# Patient Record
Sex: FEMALE | Race: OTHER | Employment: FULL TIME | URBAN - METROPOLITAN AREA
[De-identification: names, ages, dates, MRNs, and addresses within clinical notes are randomized per-mention and may not be internally consistent; named-entity substitution may affect disease eponyms.]

---

## 2023-07-04 ENCOUNTER — HOSPITAL ENCOUNTER (EMERGENCY)
Facility: HOSPITAL | Age: 38
Discharge: HOME | End: 2023-07-04
Attending: EMERGENCY MEDICINE
Payer: COMMERCIAL

## 2023-07-04 ENCOUNTER — APPOINTMENT (OUTPATIENT)
Dept: ULTRASOUND IMAGING | Facility: HOSPITAL | Age: 38
End: 2023-07-04
Attending: EMERGENCY MEDICINE
Payer: COMMERCIAL

## 2023-07-04 VITALS
RESPIRATION RATE: 18 BRPM | DIASTOLIC BLOOD PRESSURE: 96 MMHG | BODY MASS INDEX: 34.41 KG/M2 | HEIGHT: 62 IN | WEIGHT: 187 LBS | TEMPERATURE: 97 F | HEART RATE: 95 BPM | OXYGEN SATURATION: 95 % | SYSTOLIC BLOOD PRESSURE: 160 MMHG

## 2023-07-04 DIAGNOSIS — O21.9 NAUSEA AND VOMITING IN PREGNANCY: Primary | ICD-10-CM

## 2023-07-04 LAB
ALBUMIN SERPL-MCNC: 3.9 G/DL (ref 3.4–5)
ALBUMIN/GLOB SERPL: 0.9 {RATIO} (ref 1–2)
ALP LIVER SERPL-CCNC: 71 U/L
ALT SERPL-CCNC: 19 U/L
ANION GAP SERPL CALC-SCNC: 8 MMOL/L (ref 0–18)
AST SERPL-CCNC: 14 U/L (ref 15–37)
B-HCG SERPL-ACNC: ABNORMAL MIU/ML
B-HCG UR QL: POSITIVE
BASOPHILS # BLD AUTO: 0.03 X10(3) UL (ref 0–0.2)
BASOPHILS NFR BLD AUTO: 0.4 %
BILIRUB SERPL-MCNC: 0.2 MG/DL (ref 0.1–2)
BILIRUB UR QL STRIP.AUTO: NEGATIVE
BUN BLD-MCNC: 10 MG/DL (ref 7–18)
CALCIUM BLD-MCNC: 9.4 MG/DL (ref 8.5–10.1)
CHLORIDE SERPL-SCNC: 105 MMOL/L (ref 98–112)
CO2 SERPL-SCNC: 22 MMOL/L (ref 21–32)
COLOR UR AUTO: YELLOW
CREAT BLD-MCNC: 0.61 MG/DL
EOSINOPHIL # BLD AUTO: 0.08 X10(3) UL (ref 0–0.7)
EOSINOPHIL NFR BLD AUTO: 1 %
ERYTHROCYTE [DISTWIDTH] IN BLOOD BY AUTOMATED COUNT: 12.8 %
GFR SERPLBLD BASED ON 1.73 SQ M-ARVRAT: 117 ML/MIN/1.73M2 (ref 60–?)
GLOBULIN PLAS-MCNC: 4.3 G/DL (ref 2.8–4.4)
GLUCOSE BLD-MCNC: 149 MG/DL (ref 70–99)
GLUCOSE UR STRIP.AUTO-MCNC: NEGATIVE MG/DL
HCT VFR BLD AUTO: 38.4 %
HGB BLD-MCNC: 12.6 G/DL
IMM GRANULOCYTES # BLD AUTO: 0.04 X10(3) UL (ref 0–1)
IMM GRANULOCYTES NFR BLD: 0.5 %
KETONES UR STRIP.AUTO-MCNC: NEGATIVE MG/DL
LEUKOCYTE ESTERASE UR QL STRIP.AUTO: NEGATIVE
LIPASE SERPL-CCNC: 29 U/L (ref 13–75)
LYMPHOCYTES # BLD AUTO: 1.64 X10(3) UL (ref 1–4)
LYMPHOCYTES NFR BLD AUTO: 20.6 %
MCH RBC QN AUTO: 28.8 PG (ref 26–34)
MCHC RBC AUTO-ENTMCNC: 32.8 G/DL (ref 31–37)
MCV RBC AUTO: 87.9 FL
MONOCYTES # BLD AUTO: 0.65 X10(3) UL (ref 0.1–1)
MONOCYTES NFR BLD AUTO: 8.1 %
NEUTROPHILS # BLD AUTO: 5.54 X10 (3) UL (ref 1.5–7.7)
NEUTROPHILS # BLD AUTO: 5.54 X10(3) UL (ref 1.5–7.7)
NEUTROPHILS NFR BLD AUTO: 69.4 %
NITRITE UR QL STRIP.AUTO: NEGATIVE
OSMOLALITY SERPL CALC.SUM OF ELEC: 282 MOSM/KG (ref 275–295)
PH UR STRIP.AUTO: 5 [PH] (ref 5–8)
PLATELET # BLD AUTO: 357 10(3)UL (ref 150–450)
POTASSIUM SERPL-SCNC: 3.6 MMOL/L (ref 3.5–5.1)
PROT SERPL-MCNC: 8.2 G/DL (ref 6.4–8.2)
PROT UR STRIP.AUTO-MCNC: NEGATIVE MG/DL
RBC # BLD AUTO: 4.37 X10(6)UL
RBC UR QL AUTO: NEGATIVE
SODIUM SERPL-SCNC: 135 MMOL/L (ref 136–145)
SP GR UR STRIP.AUTO: 1.02 (ref 1–1.03)
UROBILINOGEN UR STRIP.AUTO-MCNC: <2 MG/DL
WBC # BLD AUTO: 8 X10(3) UL (ref 4–11)

## 2023-07-04 PROCEDURE — 83690 ASSAY OF LIPASE: CPT | Performed by: EMERGENCY MEDICINE

## 2023-07-04 PROCEDURE — 76817 TRANSVAGINAL US OBSTETRIC: CPT | Performed by: EMERGENCY MEDICINE

## 2023-07-04 PROCEDURE — 81025 URINE PREGNANCY TEST: CPT

## 2023-07-04 PROCEDURE — 84702 CHORIONIC GONADOTROPIN TEST: CPT | Performed by: EMERGENCY MEDICINE

## 2023-07-04 PROCEDURE — 81001 URINALYSIS AUTO W/SCOPE: CPT | Performed by: EMERGENCY MEDICINE

## 2023-07-04 PROCEDURE — 99284 EMERGENCY DEPT VISIT MOD MDM: CPT

## 2023-07-04 PROCEDURE — 80053 COMPREHEN METABOLIC PANEL: CPT

## 2023-07-04 PROCEDURE — 76801 OB US < 14 WKS SINGLE FETUS: CPT | Performed by: EMERGENCY MEDICINE

## 2023-07-04 PROCEDURE — 80053 COMPREHEN METABOLIC PANEL: CPT | Performed by: EMERGENCY MEDICINE

## 2023-07-04 PROCEDURE — 36415 COLL VENOUS BLD VENIPUNCTURE: CPT

## 2023-07-04 PROCEDURE — 85025 COMPLETE CBC W/AUTO DIFF WBC: CPT

## 2023-07-04 PROCEDURE — 85025 COMPLETE CBC W/AUTO DIFF WBC: CPT | Performed by: EMERGENCY MEDICINE

## 2023-07-04 PROCEDURE — 83690 ASSAY OF LIPASE: CPT

## 2023-07-04 PROCEDURE — 99285 EMERGENCY DEPT VISIT HI MDM: CPT

## 2023-07-04 RX ORDER — HYDRALAZINE HYDROCHLORIDE 50 MG/1
50 TABLET, FILM COATED ORAL 3 TIMES DAILY
Qty: 270 TABLET | Refills: 0 | Status: SHIPPED | OUTPATIENT
Start: 2023-07-04 | End: 2023-07-21

## 2023-07-04 RX ORDER — THYROID 60 MG/1
60 TABLET ORAL DAILY
COMMUNITY
End: 2023-07-21

## 2023-07-04 RX ORDER — LOSARTAN POTASSIUM 100 MG/1
100 TABLET ORAL DAILY
COMMUNITY
End: 2023-07-21

## 2023-07-04 RX ORDER — LEFLUNOMIDE 20 MG/1
20 TABLET ORAL DAILY
COMMUNITY
End: 2023-07-21

## 2023-07-04 RX ORDER — MELOXICAM 15 MG/1
15 TABLET ORAL DAILY PRN
COMMUNITY
End: 2023-07-21

## 2023-07-04 NOTE — ED INITIAL ASSESSMENT (HPI)
Patient to ED c/o headache and vomiting for three days. No fevers, endorses urinary frequency. Headache is frontal and comes and goes.

## 2023-07-20 PROBLEM — R76.8 RHEUMATOID FACTOR POSITIVE: Status: ACTIVE | Noted: 2023-07-20

## 2023-07-20 PROBLEM — M06.4 INFLAMMATORY POLYARTHROPATHY (HCC): Status: ACTIVE | Noted: 2022-09-14

## 2023-07-20 PROBLEM — D50.9 IRON DEFICIENCY ANEMIA: Status: ACTIVE | Noted: 2022-08-08

## 2023-07-20 PROBLEM — I10 ESSENTIAL HYPERTENSION: Status: ACTIVE | Noted: 2022-05-02

## 2023-07-20 PROBLEM — R73.03 PREDIABETES: Status: ACTIVE | Noted: 2023-04-25

## 2023-07-20 PROBLEM — R76.0 ABNORMAL ANTIBODY TITER: Status: ACTIVE | Noted: 2018-05-02

## 2023-07-20 PROBLEM — N92.0 MENORRHAGIA: Status: ACTIVE | Noted: 2022-05-02

## 2023-07-20 PROBLEM — O21.0 MORNING SICKNESS: Status: ACTIVE | Noted: 2023-07-05

## 2023-07-20 PROBLEM — E11.9 TYPE II DIABETES MELLITUS, WELL CONTROLLED (HCC): Status: ACTIVE | Noted: 2022-05-02

## 2023-07-20 PROBLEM — E11.9 DM (DIABETES MELLITUS) (HCC): Status: ACTIVE | Noted: 2022-05-02

## 2023-07-20 PROBLEM — M19.90 OSTEOARTHROSIS: Status: ACTIVE | Noted: 2018-05-02

## 2023-07-20 PROBLEM — O21.0: Status: ACTIVE | Noted: 2023-07-05

## 2023-07-21 ENCOUNTER — ULTRASOUND ENCOUNTER (OUTPATIENT)
Dept: OBGYN CLINIC | Facility: CLINIC | Age: 38
End: 2023-07-21
Payer: COMMERCIAL

## 2023-07-21 ENCOUNTER — OFFICE VISIT (OUTPATIENT)
Dept: OBGYN CLINIC | Facility: CLINIC | Age: 38
End: 2023-07-21
Payer: COMMERCIAL

## 2023-07-21 VITALS
SYSTOLIC BLOOD PRESSURE: 112 MMHG | WEIGHT: 187.5 LBS | HEART RATE: 86 BPM | DIASTOLIC BLOOD PRESSURE: 70 MMHG | BODY MASS INDEX: 34 KG/M2

## 2023-07-21 DIAGNOSIS — O09.521 MULTIGRAVIDA OF ADVANCED MATERNAL AGE IN FIRST TRIMESTER: ICD-10-CM

## 2023-07-21 DIAGNOSIS — O09.891 MEDICATION EXPOSURE DURING FIRST TRIMESTER OF PREGNANCY: ICD-10-CM

## 2023-07-21 DIAGNOSIS — M06.4 INFLAMMATORY POLYARTHROPATHY (HCC): ICD-10-CM

## 2023-07-21 DIAGNOSIS — R73.03 PREDIABETES: ICD-10-CM

## 2023-07-21 DIAGNOSIS — I10 ESSENTIAL HYPERTENSION: ICD-10-CM

## 2023-07-21 DIAGNOSIS — N91.2 AMENORRHEA: Primary | ICD-10-CM

## 2023-07-21 PROBLEM — O10.019: Status: ACTIVE | Noted: 2023-07-21

## 2023-07-21 PROBLEM — M47.819 SPONDYLARTHRITIS: Status: ACTIVE | Noted: 2023-07-21

## 2023-07-21 PROBLEM — O16.9 HYPERTENSION IN PREGNANCY: Status: ACTIVE | Noted: 2023-07-21

## 2023-07-21 PROBLEM — E11.9 TYPE II DIABETES MELLITUS, WELL CONTROLLED (HCC): Status: RESOLVED | Noted: 2022-05-02 | Resolved: 2023-07-21

## 2023-07-21 PROBLEM — O16.9 HYPERTENSION IN PREGNANCY (HCC): Status: ACTIVE | Noted: 2023-07-21

## 2023-07-21 PROCEDURE — 99204 OFFICE O/P NEW MOD 45 MIN: CPT | Performed by: OBSTETRICS & GYNECOLOGY

## 2023-07-21 PROCEDURE — 3078F DIAST BP <80 MM HG: CPT | Performed by: OBSTETRICS & GYNECOLOGY

## 2023-07-21 PROCEDURE — 76856 US EXAM PELVIC COMPLETE: CPT | Performed by: OBSTETRICS & GYNECOLOGY

## 2023-07-21 PROCEDURE — 3074F SYST BP LT 130 MM HG: CPT | Performed by: OBSTETRICS & GYNECOLOGY

## 2023-07-21 RX ORDER — ONDANSETRON 4 MG/1
TABLET, ORALLY DISINTEGRATING ORAL
COMMUNITY
Start: 2023-07-05

## 2023-07-21 RX ORDER — CHOLESTYRAMINE 4 G/9G
POWDER, FOR SUSPENSION ORAL
COMMUNITY
Start: 2023-07-06

## 2023-07-21 RX ORDER — AMOXICILLIN 400 MG/5ML
POWDER, FOR SUSPENSION ORAL
COMMUNITY
Start: 2023-07-19

## 2023-07-21 RX ORDER — LEVOTHYROXINE SODIUM 0.1 MG/1
TABLET ORAL
COMMUNITY
Start: 2023-07-20

## 2023-07-21 RX ORDER — LABETALOL 100 MG/1
TABLET, FILM COATED ORAL
COMMUNITY
Start: 2023-07-19

## 2023-07-21 RX ORDER — LEFLUNOMIDE 10 MG/1
TABLET ORAL
COMMUNITY
Start: 2023-03-18 | End: 2023-07-21

## 2023-07-21 RX ORDER — AMLODIPINE BESYLATE 2.5 MG/1
1 TABLET ORAL DAILY
COMMUNITY
End: 2023-07-21

## 2023-07-21 RX ORDER — DOXYLAMINE SUCCINATE AND PYRIDOXINE HYDROCHLORIDE, DELAYED RELEASE TABLETS 10 MG/10 MG 10; 10 MG/1; MG/1
2 TABLET, DELAYED RELEASE ORAL NIGHTLY
COMMUNITY

## 2023-07-21 NOTE — PROGRESS NOTES
Subjective:  Patient presents complaining of no menses. Positive home pregnancy test.  LMP 23. ED visit 23 with ultrasound showing 6w1d gestational sac. Previous pregnancy uncomplicated  section x 2. Patient was taking leflunomide Class X until 7/3/23. Patient was given cholestyramine by rheumatologist (to clear the medication from her system?). Patient was aware that she needed reliable contraception but had Paragard IUD pulled several years ago and has been having unprotected sex since then. Hx of hypothyroidism. Recent TSH 7.73 and synthroid dosage increased. Takes labetalol for hypertension. Was told she had pre diabetes but has not been treated, managed with diet. Patient RF positive, diagnosed with spondyarthritis. Chart lists abnormal antibody titre, but patient now aware of any antibody problems. Objective:  /70   Pulse 86   Wt 187 lb 8 oz (85 kg)   LMP 2023 (Approximate)     Physical Examination:  General appearance: Well dressed, well nourished in no apparent distress  Neurologic/Psychiatric: Alert and oriented to person, place and time, mood normal, affect appropriate    Summary of Ultrasound Findings:  LMP 23  8w4day by LMP;  8w1day by ultrasound  Viable intrauterine pregnancy  Dates consistent with ultrasound. Final EDC:  24 by LMP consistent with ultrasound on 2023  Normal ovaries bilaterally     Assessment/Plan:  Amenorrhea- Normal dating ultrasound  Class X medication exposure- counseled regarding high risk for birth defects. Recommend early MFM consult, FTS and Level 2 ultrasound. AMA- counseled. Likely will desire Level 2 and cfDNA  CHtn- CMP ordered today. Will need baseline urine protein  Hypothyroid- recheck TSH one month  Prediabetes- HgA1c ordered. Check early glucola  Obesity- Level 2 ultrasound  HEG- recommend discontinue Zofran. OK to continue Declegis.   Call if not tolerating po  RF Positive  Abnormal antibody titre- check Ab screen with initial OB labs  Previous CS x 2- Likely will desires BS with RCS 39 weeks  Follow up 3-4 weeks for new OB visit. Prenatal vitamin with 0.4 mg folate, DHA. Optional prenatal screening tests reviewed including cfdna, carrier screen/CF, NT/first trimester screen, Quad/AFP, Level 2 ultrasound, amniocentesis/CVS.  Bleeding precautions provided. Diagnoses and all orders for this visit:    Amenorrhea  -     US PELVIS, ABDOMINAL GYNE EMG ONLY; Future    Multigravida of advanced maternal age in first trimester  -     OP REFERRAL TO  CONSULT    Medication exposure during first trimester of pregnancy  -     OP REFERRAL TO  CONSULT    Essential hypertension  -     COMP METABOLIC PANEL (14); Future  -     OP REFERRAL TO  CONSULT    Prediabetes  -     HEMOGLOBIN A1C; Future  -     GLUCOSE 1HR OB; Future    Inflammatory polyarthropathy ([address]       Return in about 4 weeks (around 2023) for New OB Visit.

## 2023-07-26 ENCOUNTER — PATIENT MESSAGE (OUTPATIENT)
Dept: OBGYN CLINIC | Facility: CLINIC | Age: 38
End: 2023-07-26

## 2023-07-26 DIAGNOSIS — O21.9 NAUSEA/VOMITING IN PREGNANCY: Primary | ICD-10-CM

## 2023-07-27 RX ORDER — METOCLOPRAMIDE 10 MG/1
10 TABLET ORAL EVERY 6 HOURS PRN
Qty: 30 TABLET | Refills: 0 | Status: SHIPPED | OUTPATIENT
Start: 2023-07-27

## 2023-07-27 RX ORDER — ONDANSETRON 4 MG/1
TABLET, FILM COATED ORAL EVERY 8 HOURS PRN
Qty: 30 TABLET | Refills: 0 | Status: SHIPPED | OUTPATIENT
Start: 2023-07-27

## 2023-07-27 NOTE — TELEPHONE ENCOUNTER
Prescriptions for reglan and zofran sent. Would recommend trying reglan first, then adding in zofran if needed. If she is unable to keep down fluids then she needs to be seen in urgent care or emergency room for IV fluids and possible hospital admission.

## 2023-08-07 ENCOUNTER — LAB ENCOUNTER (OUTPATIENT)
Dept: LAB | Age: 38
End: 2023-08-07
Attending: OBSTETRICS & GYNECOLOGY
Payer: COMMERCIAL

## 2023-08-07 DIAGNOSIS — R73.03 PREDIABETES: ICD-10-CM

## 2023-08-07 DIAGNOSIS — I10 ESSENTIAL HYPERTENSION: ICD-10-CM

## 2023-08-07 LAB
ALBUMIN SERPL-MCNC: 3.6 G/DL (ref 3.4–5)
ALBUMIN/GLOB SERPL: 0.9 {RATIO} (ref 1–2)
ALP LIVER SERPL-CCNC: 55 U/L
ALT SERPL-CCNC: 24 U/L
ANION GAP SERPL CALC-SCNC: 9 MMOL/L (ref 0–18)
AST SERPL-CCNC: 17 U/L (ref 15–37)
BILIRUB SERPL-MCNC: 0.4 MG/DL (ref 0.1–2)
BUN BLD-MCNC: 7 MG/DL (ref 7–18)
CALCIUM BLD-MCNC: 9.6 MG/DL (ref 8.5–10.1)
CHLORIDE SERPL-SCNC: 102 MMOL/L (ref 98–112)
CO2 SERPL-SCNC: 23 MMOL/L (ref 21–32)
CREAT BLD-MCNC: 0.69 MG/DL
EGFRCR SERPLBLD CKD-EPI 2021: 112 ML/MIN/1.73M2 (ref 60–?)
EST. AVERAGE GLUCOSE BLD GHB EST-MCNC: 148 MG/DL (ref 68–126)
FASTING STATUS PATIENT QL REPORTED: YES
GLOBULIN PLAS-MCNC: 4.2 G/DL (ref 2.8–4.4)
GLUCOSE 1H P GLC SERPL-MCNC: 211 MG/DL
GLUCOSE BLD-MCNC: 220 MG/DL (ref 70–99)
HBA1C MFR BLD: 6.8 % (ref ?–5.7)
OSMOLALITY SERPL CALC.SUM OF ELEC: 283 MOSM/KG (ref 275–295)
POTASSIUM SERPL-SCNC: 3.5 MMOL/L (ref 3.5–5.1)
PROT SERPL-MCNC: 7.8 G/DL (ref 6.4–8.2)
SODIUM SERPL-SCNC: 134 MMOL/L (ref 136–145)

## 2023-08-07 PROCEDURE — 80053 COMPREHEN METABOLIC PANEL: CPT | Performed by: OBSTETRICS & GYNECOLOGY

## 2023-08-07 PROCEDURE — 83036 HEMOGLOBIN GLYCOSYLATED A1C: CPT | Performed by: OBSTETRICS & GYNECOLOGY

## 2023-08-07 PROCEDURE — 82950 GLUCOSE TEST: CPT | Performed by: OBSTETRICS & GYNECOLOGY

## 2023-08-08 DIAGNOSIS — O24.419 GESTATIONAL DIABETES MELLITUS (GDM) IN FIRST TRIMESTER, GESTATIONAL DIABETES METHOD OF CONTROL UNSPECIFIED: ICD-10-CM

## 2023-08-08 PROBLEM — O09.299 PREVIOUS GESTATIONAL DIABETES MELLITUS, ANTEPARTUM: Status: ACTIVE | Noted: 2023-08-08

## 2023-08-08 PROBLEM — Z86.32 PREVIOUS GESTATIONAL DIABETES MELLITUS, ANTEPARTUM (HCC): Status: ACTIVE | Noted: 2023-08-08

## 2023-08-08 PROBLEM — O09.299 PREVIOUS GESTATIONAL DIABETES MELLITUS, ANTEPARTUM (HCC): Status: ACTIVE | Noted: 2023-08-08

## 2023-08-08 PROBLEM — Z86.32 PREVIOUS GESTATIONAL DIABETES MELLITUS, ANTEPARTUM: Status: ACTIVE | Noted: 2023-08-08

## 2023-08-09 ENCOUNTER — ULTRASOUND ENCOUNTER (OUTPATIENT)
Dept: PERINATAL CARE | Facility: HOSPITAL | Age: 38
End: 2023-08-09
Attending: OBSTETRICS & GYNECOLOGY
Payer: COMMERCIAL

## 2023-08-09 VITALS
SYSTOLIC BLOOD PRESSURE: 130 MMHG | DIASTOLIC BLOOD PRESSURE: 83 MMHG | HEIGHT: 62 IN | HEART RATE: 90 BPM | WEIGHT: 187 LBS | BODY MASS INDEX: 34.41 KG/M2

## 2023-08-09 DIAGNOSIS — O09.521 MULTIGRAVIDA OF ADVANCED MATERNAL AGE IN FIRST TRIMESTER: ICD-10-CM

## 2023-08-09 DIAGNOSIS — O09.521 MULTIGRAVIDA OF ADVANCED MATERNAL AGE IN FIRST TRIMESTER: Primary | ICD-10-CM

## 2023-08-09 DIAGNOSIS — O99.281 HYPOTHYROIDISM AFFECTING PREGNANCY IN FIRST TRIMESTER: ICD-10-CM

## 2023-08-09 DIAGNOSIS — O09.891 MEDICATION EXPOSURE DURING FIRST TRIMESTER OF PREGNANCY: ICD-10-CM

## 2023-08-09 DIAGNOSIS — O24.111 PRE-EXISTING TYPE 2 DIABETES MELLITUS DURING PREGNANCY IN FIRST TRIMESTER: ICD-10-CM

## 2023-08-09 DIAGNOSIS — O16.1 HYPERTENSION AFFECTING PREGNANCY IN FIRST TRIMESTER: ICD-10-CM

## 2023-08-09 DIAGNOSIS — E03.9 HYPOTHYROIDISM AFFECTING PREGNANCY IN FIRST TRIMESTER: ICD-10-CM

## 2023-08-09 PROCEDURE — 76813 OB US NUCHAL MEAS 1 GEST: CPT | Performed by: OBSTETRICS & GYNECOLOGY

## 2023-08-09 PROCEDURE — 76801 OB US < 14 WKS SINGLE FETUS: CPT | Performed by: OBSTETRICS & GYNECOLOGY

## 2023-08-09 NOTE — PROGRESS NOTES
Patient notified of results and need for appointment with diabetic educator. Patient verbalized understanding. Phone number provided for scheduling.

## 2023-08-10 PROBLEM — O16.1 HYPERTENSION AFFECTING PREGNANCY IN FIRST TRIMESTER: Status: RESOLVED | Noted: 2023-08-09 | Resolved: 2023-08-10

## 2023-08-10 PROBLEM — O16.1 HYPERTENSION AFFECTING PREGNANCY IN FIRST TRIMESTER (HCC): Status: RESOLVED | Noted: 2023-08-09 | Resolved: 2023-08-10

## 2023-08-16 ENCOUNTER — NURSE ONLY (OUTPATIENT)
Dept: ENDOCRINOLOGY CLINIC | Facility: CLINIC | Age: 38
End: 2023-08-16
Payer: COMMERCIAL

## 2023-08-16 VITALS — BODY MASS INDEX: 35 KG/M2 | WEIGHT: 188.81 LBS

## 2023-08-16 DIAGNOSIS — O24.111 PRE-EXISTING TYPE 2 DIABETES MELLITUS DURING PREGNANCY IN FIRST TRIMESTER: Primary | ICD-10-CM

## 2023-08-16 PROCEDURE — G0108 DIAB MANAGE TRN  PER INDIV: HCPCS | Performed by: DIETITIAN, REGISTERED

## 2023-08-16 RX ORDER — LANCETS
EACH MISCELLANEOUS
Qty: 400 EACH | Refills: 3 | Status: SHIPPED | OUTPATIENT
Start: 2023-08-16

## 2023-08-16 RX ORDER — PERPHENAZINE 16 MG/1
1 TABLET, FILM COATED ORAL 4 TIMES DAILY
Qty: 400 EACH | Refills: 3 | Status: SHIPPED | OUTPATIENT
Start: 2023-08-16

## 2023-08-24 ENCOUNTER — INITIAL PRENATAL (OUTPATIENT)
Dept: OBGYN CLINIC | Facility: CLINIC | Age: 38
End: 2023-08-24
Payer: COMMERCIAL

## 2023-08-24 ENCOUNTER — LAB ENCOUNTER (OUTPATIENT)
Dept: LAB | Age: 38
End: 2023-08-24
Attending: STUDENT IN AN ORGANIZED HEALTH CARE EDUCATION/TRAINING PROGRAM
Payer: COMMERCIAL

## 2023-08-24 VITALS
DIASTOLIC BLOOD PRESSURE: 80 MMHG | SYSTOLIC BLOOD PRESSURE: 120 MMHG | HEART RATE: 101 BPM | WEIGHT: 187.5 LBS | BODY MASS INDEX: 34 KG/M2

## 2023-08-24 DIAGNOSIS — O21.9 NAUSEA/VOMITING IN PREGNANCY: ICD-10-CM

## 2023-08-24 DIAGNOSIS — Z3A.13 13 WEEKS GESTATION OF PREGNANCY: Primary | ICD-10-CM

## 2023-08-24 PROBLEM — Z86.32 PREVIOUS GESTATIONAL DIABETES MELLITUS, ANTEPARTUM: Status: RESOLVED | Noted: 2023-08-08 | Resolved: 2023-08-24

## 2023-08-24 PROBLEM — O24.111 PRE-EXISTING TYPE 2 DIABETES MELLITUS DURING PREGNANCY IN FIRST TRIMESTER: Status: RESOLVED | Noted: 2023-08-09 | Resolved: 2023-08-24

## 2023-08-24 PROBLEM — Z98.891 HISTORY OF CESAREAN SECTION: Status: ACTIVE | Noted: 2023-08-24

## 2023-08-24 PROBLEM — O09.299 PREVIOUS GESTATIONAL DIABETES MELLITUS, ANTEPARTUM (HCC): Status: RESOLVED | Noted: 2023-08-08 | Resolved: 2023-08-24

## 2023-08-24 PROBLEM — N92.0 MENORRHAGIA: Status: RESOLVED | Noted: 2022-05-02 | Resolved: 2023-08-24

## 2023-08-24 PROBLEM — O24.111 PRE-EXISTING TYPE 2 DIABETES MELLITUS DURING PREGNANCY IN FIRST TRIMESTER (HCC): Status: RESOLVED | Noted: 2023-08-09 | Resolved: 2023-08-24

## 2023-08-24 PROBLEM — Z86.32 PREVIOUS GESTATIONAL DIABETES MELLITUS, ANTEPARTUM (HCC): Status: RESOLVED | Noted: 2023-08-08 | Resolved: 2023-08-24

## 2023-08-24 PROBLEM — O09.521: Status: ACTIVE | Noted: 2023-07-21

## 2023-08-24 PROBLEM — O09.521 SUPERVISION OF ELDERLY MULTIGRAVIDA, FIRST TRIMESTER: Status: ACTIVE | Noted: 2023-07-21

## 2023-08-24 PROBLEM — R73.03 PREDIABETES: Status: RESOLVED | Noted: 2023-04-25 | Resolved: 2023-08-24

## 2023-08-24 PROBLEM — O09.299 PREVIOUS GESTATIONAL DIABETES MELLITUS, ANTEPARTUM: Status: RESOLVED | Noted: 2023-08-08 | Resolved: 2023-08-24

## 2023-08-24 PROBLEM — I10 ESSENTIAL HYPERTENSION: Status: RESOLVED | Noted: 2022-05-02 | Resolved: 2023-08-24

## 2023-08-24 LAB
ANTIBODY SCREEN: NEGATIVE
BASOPHILS # BLD AUTO: 0.01 X10(3) UL (ref 0–0.2)
BASOPHILS NFR BLD AUTO: 0.1 %
CREAT UR-SCNC: 135 MG/DL
EOSINOPHIL # BLD AUTO: 0.06 X10(3) UL (ref 0–0.7)
EOSINOPHIL NFR BLD AUTO: 0.6 %
ERYTHROCYTE [DISTWIDTH] IN BLOOD BY AUTOMATED COUNT: 13 %
HBV SURFACE AG SER-ACNC: <0.1 [IU]/L
HBV SURFACE AG SERPL QL IA: NONREACTIVE
HCT VFR BLD AUTO: 34.2 %
HCV AB SERPL QL IA: NONREACTIVE
HGB BLD-MCNC: 11.7 G/DL
IMM GRANULOCYTES # BLD AUTO: 0.04 X10(3) UL (ref 0–1)
IMM GRANULOCYTES NFR BLD: 0.4 %
LYMPHOCYTES # BLD AUTO: 1.42 X10(3) UL (ref 1–4)
LYMPHOCYTES NFR BLD AUTO: 14.1 %
MCH RBC QN AUTO: 29.8 PG (ref 26–34)
MCHC RBC AUTO-ENTMCNC: 34.2 G/DL (ref 31–37)
MCV RBC AUTO: 87.2 FL
MONOCYTES # BLD AUTO: 0.49 X10(3) UL (ref 0.1–1)
MONOCYTES NFR BLD AUTO: 4.9 %
NEUTROPHILS # BLD AUTO: 8.08 X10 (3) UL (ref 1.5–7.7)
NEUTROPHILS # BLD AUTO: 8.08 X10(3) UL (ref 1.5–7.7)
NEUTROPHILS NFR BLD AUTO: 79.9 %
PLATELET # BLD AUTO: 356 10(3)UL (ref 150–450)
PROT UR-MCNC: 18.6 MG/DL
PROT/CREAT UR-RTO: 0.14
RBC # BLD AUTO: 3.92 X10(6)UL
RH BLOOD TYPE: POSITIVE
T PALLIDUM AB SER QL IA: NONREACTIVE
TSI SER-ACNC: 0.49 MIU/ML (ref 0.36–3.74)
WBC # BLD AUTO: 10.1 X10(3) UL (ref 4–11)

## 2023-08-24 PROCEDURE — 3079F DIAST BP 80-89 MM HG: CPT | Performed by: STUDENT IN AN ORGANIZED HEALTH CARE EDUCATION/TRAINING PROGRAM

## 2023-08-24 PROCEDURE — 36415 COLL VENOUS BLD VENIPUNCTURE: CPT

## 2023-08-24 PROCEDURE — 87389 HIV-1 AG W/HIV-1&-2 AB AG IA: CPT

## 2023-08-24 PROCEDURE — 86803 HEPATITIS C AB TEST: CPT

## 2023-08-24 PROCEDURE — 87491 CHLMYD TRACH DNA AMP PROBE: CPT | Performed by: STUDENT IN AN ORGANIZED HEALTH CARE EDUCATION/TRAINING PROGRAM

## 2023-08-24 PROCEDURE — 85025 COMPLETE CBC W/AUTO DIFF WBC: CPT

## 2023-08-24 PROCEDURE — 87340 HEPATITIS B SURFACE AG IA: CPT

## 2023-08-24 PROCEDURE — 86900 BLOOD TYPING SEROLOGIC ABO: CPT

## 2023-08-24 PROCEDURE — 87591 N.GONORRHOEAE DNA AMP PROB: CPT | Performed by: STUDENT IN AN ORGANIZED HEALTH CARE EDUCATION/TRAINING PROGRAM

## 2023-08-24 PROCEDURE — 82570 ASSAY OF URINE CREATININE: CPT | Performed by: STUDENT IN AN ORGANIZED HEALTH CARE EDUCATION/TRAINING PROGRAM

## 2023-08-24 PROCEDURE — 87086 URINE CULTURE/COLONY COUNT: CPT | Performed by: STUDENT IN AN ORGANIZED HEALTH CARE EDUCATION/TRAINING PROGRAM

## 2023-08-24 PROCEDURE — 86901 BLOOD TYPING SEROLOGIC RH(D): CPT

## 2023-08-24 PROCEDURE — 3074F SYST BP LT 130 MM HG: CPT | Performed by: STUDENT IN AN ORGANIZED HEALTH CARE EDUCATION/TRAINING PROGRAM

## 2023-08-24 PROCEDURE — 86850 RBC ANTIBODY SCREEN: CPT

## 2023-08-24 PROCEDURE — 88175 CYTOPATH C/V AUTO FLUID REDO: CPT | Performed by: STUDENT IN AN ORGANIZED HEALTH CARE EDUCATION/TRAINING PROGRAM

## 2023-08-24 PROCEDURE — 86780 TREPONEMA PALLIDUM: CPT

## 2023-08-24 PROCEDURE — 84443 ASSAY THYROID STIM HORMONE: CPT

## 2023-08-24 PROCEDURE — 84156 ASSAY OF PROTEIN URINE: CPT | Performed by: STUDENT IN AN ORGANIZED HEALTH CARE EDUCATION/TRAINING PROGRAM

## 2023-08-24 RX ORDER — ONDANSETRON 4 MG/1
4 TABLET, FILM COATED ORAL EVERY 8 HOURS PRN
Qty: 30 TABLET | Refills: 3 | Status: SHIPPED | OUTPATIENT
Start: 2023-08-24

## 2023-08-24 RX ORDER — ASPIRIN 81 MG/1
81 TABLET ORAL DAILY
Qty: 90 TABLET | Refills: 3 | Status: SHIPPED | OUTPATIENT
Start: 2023-08-24

## 2023-08-24 RX ORDER — LABETALOL 200 MG/1
TABLET, FILM COATED ORAL
COMMUNITY
Start: 2023-08-21

## 2023-08-24 RX ORDER — DOXYLAMINE SUCCINATE AND PYRIDOXINE HYDROCHLORIDE, DELAYED RELEASE TABLETS 10 MG/10 MG 10; 10 MG/1; MG/1
2 TABLET, DELAYED RELEASE ORAL NIGHTLY
Qty: 240 TABLET | Refills: 0 | Status: SHIPPED | OUTPATIENT
Start: 2023-08-24

## 2023-08-24 RX ORDER — AMLODIPINE BESYLATE 2.5 MG/1
2.5 TABLET ORAL DAILY
COMMUNITY
Start: 2023-04-25 | End: 2023-08-24

## 2023-08-24 RX ORDER — SCOLOPAMINE TRANSDERMAL SYSTEM 1 MG/1
1 PATCH, EXTENDED RELEASE TRANSDERMAL
Qty: 10 PATCH | Refills: 3 | Status: SHIPPED | OUTPATIENT
Start: 2023-08-24

## 2023-08-25 LAB
C TRACH DNA SPEC QL NAA+PROBE: NEGATIVE
N GONORRHOEA DNA SPEC QL NAA+PROBE: NEGATIVE

## 2023-08-29 ENCOUNTER — TELEPHONE (OUTPATIENT)
Dept: OBGYN CLINIC | Facility: CLINIC | Age: 38
End: 2023-08-29

## 2023-08-29 ENCOUNTER — PATIENT MESSAGE (OUTPATIENT)
Dept: OBGYN CLINIC | Facility: CLINIC | Age: 38
End: 2023-08-29

## 2023-08-29 DIAGNOSIS — O24.112 PRE-EXISTING TYPE 2 DIABETES MELLITUS DURING PREGNANCY IN SECOND TRIMESTER: Primary | ICD-10-CM

## 2023-08-29 DIAGNOSIS — O09.521 MULTIGRAVIDA OF ADVANCED MATERNAL AGE IN FIRST TRIMESTER: ICD-10-CM

## 2023-08-29 DIAGNOSIS — O09.521 MULTIGRAVIDA OF ADVANCED MATERNAL AGE IN FIRST TRIMESTER: Primary | ICD-10-CM

## 2023-08-29 DIAGNOSIS — I10 ESSENTIAL HYPERTENSION: ICD-10-CM

## 2023-08-29 DIAGNOSIS — O24.410 DIET CONTROLLED GESTATIONAL DIABETES MELLITUS (GDM) IN FIRST TRIMESTER: Primary | ICD-10-CM

## 2023-08-29 DIAGNOSIS — O09.891 MEDICATION EXPOSURE DURING FIRST TRIMESTER OF PREGNANCY: ICD-10-CM

## 2023-08-29 DIAGNOSIS — O24.112 PRE-EXISTING TYPE 2 DIABETES MELLITUS DURING PREGNANCY IN SECOND TRIMESTER: ICD-10-CM

## 2023-08-31 ENCOUNTER — DIABETIC EDUCATION (OUTPATIENT)
Dept: ENDOCRINOLOGY CLINIC | Facility: CLINIC | Age: 38
End: 2023-08-31
Payer: COMMERCIAL

## 2023-08-31 DIAGNOSIS — O24.113 PRE-EXISTING TYPE 2 DIABETES MELLITUS DURING PREGNANCY IN THIRD TRIMESTER: Primary | ICD-10-CM

## 2023-09-07 ENCOUNTER — TELEPHONE (OUTPATIENT)
Dept: OBGYN CLINIC | Facility: CLINIC | Age: 38
End: 2023-09-07

## 2023-09-07 ENCOUNTER — TELEPHONE (OUTPATIENT)
Dept: PERINATAL CARE | Facility: HOSPITAL | Age: 38
End: 2023-09-07

## 2023-09-07 DIAGNOSIS — O24.111 PRE-EXISTING TYPE 2 DIABETES MELLITUS DURING PREGNANCY IN FIRST TRIMESTER: Primary | ICD-10-CM

## 2023-09-07 RX ORDER — INSULIN ASPART 100 [IU]/ML
INJECTION, SOLUTION INTRAVENOUS; SUBCUTANEOUS
Qty: 5 EACH | Refills: 11 | Status: SHIPPED | OUTPATIENT
Start: 2023-09-07 | End: 2023-09-11 | Stop reason: CLARIF

## 2023-09-07 RX ORDER — INSULIN DETEMIR 100 [IU]/ML
10 INJECTION, SOLUTION SUBCUTANEOUS NIGHTLY
Qty: 5 EACH | Refills: 11 | Status: SHIPPED | OUTPATIENT
Start: 2023-09-07

## 2023-09-07 NOTE — TELEPHONE ENCOUNTER
15w3d  Anai from [facility] called to notify our office that this patient is starting insulin. Per Dr. Weinstein April:  Levemir: 10 u subcutaneous at bedtime  Aspart  Breakfast: 6 units  Lunch: 10 units  Dinner: 10 units    Next appointment is scheduled with JANAE Madrid- routed as FYI only.

## 2023-09-07 NOTE — TELEPHONE ENCOUNTER
15w3d  Received BS log for date range 9/4-9/6     Low  High Out of Range   Fasting Blood Sugar 101 126 3/3   Post Breakfast 127 166 3/3   Post Lunch 145 208 3/3   Post Dinner 160 179 2/2     Patient is currently diet controlled. Pt has been following with OB and dietician. Pt was referred for MFM to follow sugars on 8/29. Pt started logging mychart on the 4th. To Dr. Veronica Pearl for review.

## 2023-09-07 NOTE — TELEPHONE ENCOUNTER
New regimen:  Please call patient and inform her that I am advising that she initiate insulin    Initiate insulin:  Levemir: 10 u subcutaneous at bedtime  Aspart  Breakfast: 6 units  Lunch: 10 units  Dinner: 10 units    Jovita Pineda. Damian Gonzalez M.D.   Maternal-Fetal Medicine

## 2023-09-11 ENCOUNTER — TELEPHONE (OUTPATIENT)
Dept: OBGYN CLINIC | Facility: CLINIC | Age: 38
End: 2023-09-11

## 2023-09-11 ENCOUNTER — HOSPITAL ENCOUNTER (EMERGENCY)
Facility: HOSPITAL | Age: 38
Discharge: HOME | End: 2023-09-11
Attending: EMERGENCY MEDICINE
Payer: COMMERCIAL

## 2023-09-11 VITALS
HEIGHT: 62 IN | HEART RATE: 93 BPM | BODY MASS INDEX: 34.96 KG/M2 | SYSTOLIC BLOOD PRESSURE: 144 MMHG | WEIGHT: 190 LBS | TEMPERATURE: 98 F | DIASTOLIC BLOOD PRESSURE: 88 MMHG | OXYGEN SATURATION: 98 % | RESPIRATION RATE: 21 BRPM

## 2023-09-11 DIAGNOSIS — K59.00 CONSTIPATION, UNSPECIFIED CONSTIPATION TYPE: Primary | ICD-10-CM

## 2023-09-11 DIAGNOSIS — Z34.92 SECOND TRIMESTER PREGNANCY: ICD-10-CM

## 2023-09-11 PROCEDURE — 99283 EMERGENCY DEPT VISIT LOW MDM: CPT

## 2023-09-11 NOTE — TELEPHONE ENCOUNTER
G3/P 2 GA 16 wks patient complaining of constipation. Last BM was 3 days ago. Has only used Zofran a couple times. Has been trying to increase fluids. Last OV: 23 NOB with Dr. Chaitanya Salas   Pregnancy Complications: obesity, hypothyroidism, hyperproteinemia, type 2 DM, inflammatory polyarthropathy, rheumatoid factor positive, AMA, medication exposure in 1st trimester, HTN, hx of  delivery x2  Abdominal pain: cramping  Leaking of fluid: denies  Vaginal Bleeding: denies  Fetal Movement: n/a 16 wks  Recommendations: encouraged fluids as she is able. Encouraged Colace BID and advised that she can try Doculax suppository if desired. To call back with any new, worsening or persisting concerns. Patient states understanding.

## 2023-09-11 NOTE — TELEPHONE ENCOUNTER
Pt calling with constipation and pressure issues- pt informed of safe medications to take while pregnant but asking is she can use a suppository.

## 2023-09-12 ENCOUNTER — OFFICE VISIT (OUTPATIENT)
Dept: PERINATAL CARE | Facility: HOSPITAL | Age: 38
End: 2023-09-12
Attending: STUDENT IN AN ORGANIZED HEALTH CARE EDUCATION/TRAINING PROGRAM
Payer: COMMERCIAL

## 2023-09-12 ENCOUNTER — ULTRASOUND ENCOUNTER (OUTPATIENT)
Dept: PERINATAL CARE | Facility: HOSPITAL | Age: 38
End: 2023-09-12
Attending: OBSTETRICS & GYNECOLOGY
Payer: COMMERCIAL

## 2023-09-12 VITALS
HEART RATE: 93 BPM | BODY MASS INDEX: 35.33 KG/M2 | DIASTOLIC BLOOD PRESSURE: 71 MMHG | HEIGHT: 62 IN | SYSTOLIC BLOOD PRESSURE: 109 MMHG | WEIGHT: 192 LBS

## 2023-09-12 DIAGNOSIS — O16.2 HYPERTENSION AFFECTING PREGNANCY IN SECOND TRIMESTER: ICD-10-CM

## 2023-09-12 DIAGNOSIS — O16.9 HYPERTENSION AFFECTING PREGNANCY: ICD-10-CM

## 2023-09-12 DIAGNOSIS — O09.891 MEDICATION EXPOSURE DURING FIRST TRIMESTER OF PREGNANCY: ICD-10-CM

## 2023-09-12 DIAGNOSIS — O24.419 GDM (GESTATIONAL DIABETES MELLITUS): ICD-10-CM

## 2023-09-12 DIAGNOSIS — O24.112 PRE-EXISTING TYPE 2 DIABETES MELLITUS DURING PREGNANCY IN SECOND TRIMESTER: ICD-10-CM

## 2023-09-12 DIAGNOSIS — O09.529 AMA (ADVANCED MATERNAL AGE) MULTIGRAVIDA 35+: ICD-10-CM

## 2023-09-12 DIAGNOSIS — O09.521 MULTIGRAVIDA OF ADVANCED MATERNAL AGE IN FIRST TRIMESTER: ICD-10-CM

## 2023-09-12 DIAGNOSIS — O24.419 GDM (GESTATIONAL DIABETES MELLITUS): Primary | ICD-10-CM

## 2023-09-12 DIAGNOSIS — O24.111 PRE-EXISTING TYPE 2 DIABETES MELLITUS DURING PREGNANCY IN FIRST TRIMESTER: ICD-10-CM

## 2023-09-12 PROCEDURE — 76816 OB US FOLLOW-UP PER FETUS: CPT | Performed by: OBSTETRICS & GYNECOLOGY

## 2023-09-12 PROCEDURE — 76805 OB US >/= 14 WKS SNGL FETUS: CPT | Performed by: OBSTETRICS & GYNECOLOGY

## 2023-09-12 RX ORDER — INSULIN DETEMIR 100 [IU]/ML
18 INJECTION, SOLUTION SUBCUTANEOUS NIGHTLY
Qty: 5 EACH | Refills: 11 | Status: SHIPPED | OUTPATIENT
Start: 2023-09-12 | End: 2023-09-13

## 2023-09-12 NOTE — DISCHARGE INSTRUCTIONS
Purchase and take an over-the-counter stool softener such as Colace or senna on a twice daily basis. You may use milk of magnesia as needed for additional laxative use.

## 2023-09-12 NOTE — ED QUICK NOTES
Patient to ER c/o constipation for 3 days. Patient is 16 weeks pregnant. Reports trying prune juice. No enemas or suppositories.

## 2023-09-13 RX ORDER — INSULIN GLARGINE-YFGN 100 [IU]/ML
18 INJECTION, SOLUTION SUBCUTANEOUS NIGHTLY
Qty: 15 ML | Refills: 3 | Status: SHIPPED | OUTPATIENT
Start: 2023-09-13

## 2023-09-19 ENCOUNTER — ROUTINE PRENATAL (OUTPATIENT)
Dept: OBGYN CLINIC | Facility: CLINIC | Age: 38
End: 2023-09-19
Payer: COMMERCIAL

## 2023-09-19 VITALS
WEIGHT: 169.81 LBS | SYSTOLIC BLOOD PRESSURE: 122 MMHG | BODY MASS INDEX: 31.25 KG/M2 | DIASTOLIC BLOOD PRESSURE: 80 MMHG | HEIGHT: 62 IN

## 2023-09-19 DIAGNOSIS — Z36.8A ENCOUNTER FOR ANTENATAL SCREENING FOR OTHER GENETIC DEFECT: ICD-10-CM

## 2023-09-19 DIAGNOSIS — O09.522 AMA (ADVANCED MATERNAL AGE) MULTIGRAVIDA 35+, SECOND TRIMESTER: ICD-10-CM

## 2023-09-19 DIAGNOSIS — Z3A.17 17 WEEKS GESTATION OF PREGNANCY: Primary | ICD-10-CM

## 2023-09-19 DIAGNOSIS — O09.891 MEDICATION EXPOSURE DURING FIRST TRIMESTER OF PREGNANCY: ICD-10-CM

## 2023-09-19 DIAGNOSIS — O24.112 PRE-EXISTING TYPE 2 DIABETES MELLITUS DURING PREGNANCY IN SECOND TRIMESTER: ICD-10-CM

## 2023-09-19 DIAGNOSIS — O10.012 PRE-EXISTING ESSENTIAL HYPERTENSION DURING PREGNANCY IN SECOND TRIMESTER: ICD-10-CM

## 2023-09-19 PROCEDURE — 3079F DIAST BP 80-89 MM HG: CPT | Performed by: NURSE PRACTITIONER

## 2023-09-19 PROCEDURE — 3074F SYST BP LT 130 MM HG: CPT | Performed by: NURSE PRACTITIONER

## 2023-09-19 PROCEDURE — 3008F BODY MASS INDEX DOCD: CPT | Performed by: NURSE PRACTITIONER

## 2023-09-19 RX ORDER — INSULIN GLARGINE-YFGN 100 [IU]/ML
INJECTION, SOLUTION SUBCUTANEOUS
Qty: 15 ML | Refills: 3 | Status: SHIPPED | OUTPATIENT
Start: 2023-09-19

## 2023-09-19 RX ORDER — DOCUSATE SODIUM 100 MG/1
CAPSULE, LIQUID FILLED ORAL
COMMUNITY
Start: 2023-09-18

## 2023-09-19 NOTE — PROGRESS NOTES
BOWEN--17w1d    Pt notes that she has been constipated. Has started on Colace. Also has been having to use insulin which is new for her. Insulin is regulated by MFM. Laine Addison Denies Vb, LOF, contractions. Thinks that she is feeling fetal movement. A/P:   FHT-P  PNL: desires MSAFP.  Order placed  RH positive  AMA/CHTN/Med exposure: Level II US with MFM, weekly NST 32 weeks, monthly growth US in 3T,  DMII: blood sugar monitoring per MFM      Return in 4 weeks

## 2023-09-27 ENCOUNTER — TELEPHONE (OUTPATIENT)
Dept: PERINATAL CARE | Facility: HOSPITAL | Age: 38
End: 2023-09-27

## 2023-09-27 ENCOUNTER — TELEPHONE (OUTPATIENT)
Dept: OBGYN CLINIC | Facility: CLINIC | Age: 38
End: 2023-09-27

## 2023-09-27 RX ORDER — INSULIN GLARGINE-YFGN 100 [IU]/ML
INJECTION, SOLUTION SUBCUTANEOUS
Qty: 15 ML | Refills: 3 | Status: SHIPPED | OUTPATIENT
Start: 2023-09-27

## 2023-09-27 NOTE — TELEPHONE ENCOUNTER
Patient calling with c/o increased discharge. Reports it is usually clear and sometimes a light pink tinge to it. Denies any vaginal itching, irritation, or smell. Patient advised to continue to monitor and call the office if she develops any symptoms. Patient verbalized understanding; no further questions.

## 2023-09-27 NOTE — TELEPHONE ENCOUNTER
18w2d  Received BS log for date range 9/20-9/26     Low  High Out of Range   Fasting Blood Sugar 98 115 7/7   Post Breakfast 110 192 5/7   Post Lunch 83 193 4/6   Post Dinner 93 148 4/6     Patient is currently taking:    Levemir 10 units in the morning  Levemir 26 units HS  Aspart 20 units with breakfast  Aspart 20 units with lunch  Aspart 20 units with dinner     To Dr. Lilia Spaulding for review.

## 2023-09-27 NOTE — TELEPHONE ENCOUNTER
Pt called and would like to speak to a nurse because she is having discharge that wets her pants and is concerned

## 2023-09-27 NOTE — TELEPHONE ENCOUNTER
Levemir 12 units in the morning  Levemir 32 units HS  Aspart 24 units with breakfast  Aspart 24 units with lunch  Aspart 24 units with dinner

## 2023-10-04 RX ORDER — INSULIN GLARGINE-YFGN 100 [IU]/ML
INJECTION, SOLUTION SUBCUTANEOUS
Qty: 15 ML | Refills: 3 | Status: SHIPPED | OUTPATIENT
Start: 2023-10-04

## 2023-10-09 ENCOUNTER — OFFICE VISIT (OUTPATIENT)
Dept: PERINATAL CARE | Facility: HOSPITAL | Age: 38
End: 2023-10-09
Attending: OBSTETRICS & GYNECOLOGY
Payer: COMMERCIAL

## 2023-10-09 VITALS
BODY MASS INDEX: 35.51 KG/M2 | SYSTOLIC BLOOD PRESSURE: 114 MMHG | HEART RATE: 97 BPM | DIASTOLIC BLOOD PRESSURE: 79 MMHG | WEIGHT: 193 LBS | HEIGHT: 62 IN

## 2023-10-09 DIAGNOSIS — E11.9 TYPE II DIABETES MELLITUS, WELL CONTROLLED (HCC): ICD-10-CM

## 2023-10-09 DIAGNOSIS — O09.522 MULTIGRAVIDA OF ADVANCED MATERNAL AGE IN SECOND TRIMESTER: Primary | ICD-10-CM

## 2023-10-09 DIAGNOSIS — O09.521 SUPERVISION OF ELDERLY MULTIGRAVIDA, FIRST TRIMESTER: ICD-10-CM

## 2023-10-09 DIAGNOSIS — O09.522 MULTIGRAVIDA OF ADVANCED MATERNAL AGE IN SECOND TRIMESTER: ICD-10-CM

## 2023-10-09 DIAGNOSIS — O44.42 LOW LYING PLACENTA NOS OR WITHOUT HEMORRHAGE, SECOND TRIMESTER: ICD-10-CM

## 2023-10-09 DIAGNOSIS — O09.891 MEDICATION EXPOSURE DURING FIRST TRIMESTER OF PREGNANCY: ICD-10-CM

## 2023-10-09 DIAGNOSIS — O10.012 PRE-EXISTING ESSENTIAL HYPERTENSION DURING PREGNANCY IN SECOND TRIMESTER: ICD-10-CM

## 2023-10-09 PROCEDURE — 76811 OB US DETAILED SNGL FETUS: CPT | Performed by: OBSTETRICS & GYNECOLOGY

## 2023-10-10 DIAGNOSIS — O24.111 PRE-EXISTING TYPE 2 DIABETES MELLITUS DURING PREGNANCY IN FIRST TRIMESTER: ICD-10-CM

## 2023-10-10 PROBLEM — O44.40 LOW-LYING PLACENTA: Status: ACTIVE | Noted: 2023-10-10

## 2023-10-10 PROBLEM — O44.40 LOW-LYING PLACENTA (HCC): Status: ACTIVE | Noted: 2023-10-10

## 2023-10-10 PROBLEM — O24.119 PRE-EXISTING TYPE 2 DIABETES MELLITUS DURING PREGNANCY, ANTEPARTUM: Status: ACTIVE | Noted: 2023-10-10

## 2023-10-10 PROBLEM — O24.119 PRE-EXISTING TYPE 2 DIABETES MELLITUS DURING PREGNANCY, ANTEPARTUM (HCC): Status: ACTIVE | Noted: 2023-10-10

## 2023-10-10 RX ORDER — LANCETS
EACH MISCELLANEOUS
Qty: 400 EACH | Refills: 3 | Status: SHIPPED | OUTPATIENT
Start: 2023-10-10

## 2023-10-12 ENCOUNTER — TELEPHONE (OUTPATIENT)
Dept: PERINATAL CARE | Facility: HOSPITAL | Age: 38
End: 2023-10-12

## 2023-10-12 RX ORDER — INSULIN GLARGINE-YFGN 100 [IU]/ML
INJECTION, SOLUTION SUBCUTANEOUS
COMMUNITY
Start: 2023-10-12

## 2023-10-12 NOTE — TELEPHONE ENCOUNTER
New regimen:    Glargine 18 units in the morning  Glargine 42 units HS  Aspart 32 units TID with meals    Latanya Sample. Samantha Patel M.D.   Maternal-Fetal Medicine

## 2023-10-12 NOTE — TELEPHONE ENCOUNTER
20w3d  Received BS log for date range 10/4-10/11     Low  High Out of Range   Fasting Blood Sugar 93 101 5/6   Post Breakfast 95 125 6/7   Post Lunch 123 135 6/6   Post Dinner 134 141 5/5     Patient is currently taking:    Glargine 14 units in the morning  Glargine 38 units HS  Aspart 28 units TID with meals    To Dr. Felicia Swartz for review.

## 2023-10-16 ENCOUNTER — LAB ENCOUNTER (OUTPATIENT)
Dept: LAB | Age: 38
End: 2023-10-16
Attending: OBSTETRICS & GYNECOLOGY
Payer: COMMERCIAL

## 2023-10-16 ENCOUNTER — ROUTINE PRENATAL (OUTPATIENT)
Dept: OBGYN CLINIC | Facility: CLINIC | Age: 38
End: 2023-10-16
Payer: COMMERCIAL

## 2023-10-16 VITALS
HEART RATE: 83 BPM | DIASTOLIC BLOOD PRESSURE: 70 MMHG | BODY MASS INDEX: 36 KG/M2 | WEIGHT: 199.19 LBS | SYSTOLIC BLOOD PRESSURE: 130 MMHG

## 2023-10-16 DIAGNOSIS — E03.9 HYPOTHYROIDISM, UNSPECIFIED TYPE: ICD-10-CM

## 2023-10-16 DIAGNOSIS — Z36.9 ANTENATAL SCREENING ENCOUNTER: ICD-10-CM

## 2023-10-16 DIAGNOSIS — Z34.90 PRENATAL CARE, ANTEPARTUM: Primary | ICD-10-CM

## 2023-10-16 LAB
RUBV IGG SER QL: POSITIVE
RUBV IGG SER-ACNC: 115.6 IU/ML (ref 10–?)
TSI SER-ACNC: 1.6 MIU/ML (ref 0.36–3.74)

## 2023-10-16 PROCEDURE — 84443 ASSAY THYROID STIM HORMONE: CPT

## 2023-10-16 PROCEDURE — 3078F DIAST BP <80 MM HG: CPT | Performed by: OBSTETRICS & GYNECOLOGY

## 2023-10-16 PROCEDURE — 36415 COLL VENOUS BLD VENIPUNCTURE: CPT

## 2023-10-16 PROCEDURE — 86762 RUBELLA ANTIBODY: CPT

## 2023-10-16 PROCEDURE — 3075F SYST BP GE 130 - 139MM HG: CPT | Performed by: OBSTETRICS & GYNECOLOGY

## 2023-10-16 RX ORDER — INSULIN DETEMIR 100 [IU]/ML
INJECTION, SOLUTION SUBCUTANEOUS
COMMUNITY
Start: 2023-09-30

## 2023-10-16 NOTE — PROGRESS NOTES
Patient presents with:  Prenatal Care: BOWEN    Routine prenatal visit. Patient without complaints. Good fetal movement. Patient denies any bleeding, leaking fluid, cramping, or contractions. Assessment/Plan:  21w0d doing well  Never did AFP, now too late. Still needs Rubella. Will add TSH  Lab Results   Component Value Date    ABO A 2023    RH Positive 2023   Screening for fetal malformations- Had Level 2 and CVS, normal  Schedule RCS next visit. Diagnoses and all orders for this visit:    Prenatal care, antepartum     screening encounter  -     Rubella, IGG; Future    Hypothyroidism, unspecified type  -     Assay, Thyroid Stim Hormone; Future      Return in about 4 weeks (around 2023) for Routine Prenatal Visit.

## 2023-10-19 ENCOUNTER — TELEPHONE (OUTPATIENT)
Dept: PERINATAL CARE | Facility: HOSPITAL | Age: 38
End: 2023-10-19

## 2023-10-19 RX ORDER — INSULIN GLARGINE-YFGN 100 [IU]/ML
INJECTION, SOLUTION SUBCUTANEOUS
Qty: 15 ML | Refills: 3 | Status: SHIPPED | OUTPATIENT
Start: 2023-10-19

## 2023-10-19 NOTE — TELEPHONE ENCOUNTER
21w3d  Received BS log for date range 10/12-10/18     Low  High Out of Range   Fasting Blood Sugar 93 109 6/7   Post Breakfast 111 142 5/7   Post Lunch 130 146 6/6   Post Dinner 125 143 6/6     Patient is currently taking:    Glargine 18 units in the morning  Glargine 42 units HS  Aspart 32 units tid with meals     To Dr. Isaak Spicer for review.

## 2023-10-19 NOTE — TELEPHONE ENCOUNTER
New regimen:    Glargine 26 units in the morning  Glargine 48 units HS  Aspart 36 units tid with meals    Ronnyalyn Shown. Kenney Ching M.D.   Maternal-Fetal Medicine

## 2023-10-25 ENCOUNTER — TELEPHONE (OUTPATIENT)
Dept: PERINATAL CARE | Facility: HOSPITAL | Age: 38
End: 2023-10-25

## 2023-10-25 NOTE — TELEPHONE ENCOUNTER
22w2d  Received BS log for date range 10/20-10/24     Low  High Out of Range   Fasting Blood Sugar 82 95 2/5   Post Breakfast 110 128 2/5   Post Lunch 108 125 2/4   Post Dinner 110 123 1/4     Pt is currently taking Glargine 26 units in the A. M., 48 units at bedtime. Aspart 36 units w/breakfast, 36 units w/lunch, 36 units w/dinner. To Dr. Ksenia Oseguera for review.

## 2023-10-25 NOTE — TELEPHONE ENCOUNTER
Glargine 30 units in the A.M., 52 units at bedtime. Aspart 40 units w/breakfast, 40 units w/lunch, 40 units w/dinner.

## 2023-11-02 ENCOUNTER — TELEPHONE (OUTPATIENT)
Dept: PERINATAL CARE | Facility: HOSPITAL | Age: 38
End: 2023-11-02

## 2023-11-02 NOTE — TELEPHONE ENCOUNTER
23w3d  Received BS log for date range 10/26-11/2     Low  High Out of Range   Fasting Blood Sugar 80 98 3/8   Post Breakfast 106 121 1/8   Post Lunch 105 128 3/7   Post Dinner 110 130 6/7     Patient is currently taking Glargine 30 units in the A.M., 52 units at bedtime. Aspart 40 units w/breakfast, 40 units w/lunch, 40 units w/dinner. To Dr. Lizandro Mata for review. LOV: 11/28/2022

## 2023-11-02 NOTE — TELEPHONE ENCOUNTER
Glargine 36 units in the A. M., 60 units at bedtime divided into 2 injections of 30 each  Aspart 48 units w/breakfast, 48 units w/lunch, 48 units w/dinner.

## 2023-11-06 ENCOUNTER — ULTRASOUND ENCOUNTER (OUTPATIENT)
Dept: PERINATAL CARE | Facility: HOSPITAL | Age: 38
End: 2023-11-06
Attending: OBSTETRICS & GYNECOLOGY
Payer: COMMERCIAL

## 2023-11-06 VITALS
HEART RATE: 96 BPM | BODY MASS INDEX: 37.36 KG/M2 | HEIGHT: 62 IN | DIASTOLIC BLOOD PRESSURE: 69 MMHG | SYSTOLIC BLOOD PRESSURE: 101 MMHG | WEIGHT: 203 LBS

## 2023-11-06 DIAGNOSIS — O09.522 MULTIGRAVIDA OF ADVANCED MATERNAL AGE IN SECOND TRIMESTER: ICD-10-CM

## 2023-11-06 DIAGNOSIS — O09.522 MULTIGRAVIDA OF ADVANCED MATERNAL AGE IN SECOND TRIMESTER: Primary | ICD-10-CM

## 2023-11-06 DIAGNOSIS — O09.529 AMA (ADVANCED MATERNAL AGE) MULTIGRAVIDA 35+: ICD-10-CM

## 2023-11-06 DIAGNOSIS — O10.012 PRE-EXISTING ESSENTIAL HYPERTENSION DURING PREGNANCY IN SECOND TRIMESTER: ICD-10-CM

## 2023-11-06 DIAGNOSIS — O09.891 MEDICATION EXPOSURE DURING FIRST TRIMESTER OF PREGNANCY: ICD-10-CM

## 2023-11-06 DIAGNOSIS — E11.9 TYPE II DIABETES MELLITUS, WELL CONTROLLED (HCC): ICD-10-CM

## 2023-11-06 PROCEDURE — 76827 ECHO EXAM OF FETAL HEART: CPT

## 2023-11-06 PROCEDURE — 76825 ECHO EXAM OF FETAL HEART: CPT | Performed by: OBSTETRICS & GYNECOLOGY

## 2023-11-06 PROCEDURE — 93325 DOPPLER ECHO COLOR FLOW MAPG: CPT

## 2023-11-13 ENCOUNTER — ROUTINE PRENATAL (OUTPATIENT)
Dept: OBGYN CLINIC | Facility: CLINIC | Age: 38
End: 2023-11-13
Payer: COMMERCIAL

## 2023-11-13 VITALS
DIASTOLIC BLOOD PRESSURE: 72 MMHG | WEIGHT: 204 LBS | HEART RATE: 84 BPM | SYSTOLIC BLOOD PRESSURE: 116 MMHG | BODY MASS INDEX: 37 KG/M2

## 2023-11-13 DIAGNOSIS — E11.9 TYPE II DIABETES MELLITUS, WELL CONTROLLED (HCC): ICD-10-CM

## 2023-11-13 DIAGNOSIS — O99.891 BACK PAIN AFFECTING PREGNANCY IN SECOND TRIMESTER: ICD-10-CM

## 2023-11-13 DIAGNOSIS — O09.522 ENCOUNTER FOR SUPERVISION OF MULTIGRAVIDA OF ADVANCED MATERNAL AGE IN SECOND TRIMESTER: Primary | ICD-10-CM

## 2023-11-13 DIAGNOSIS — M54.9 BACK PAIN AFFECTING PREGNANCY IN SECOND TRIMESTER: ICD-10-CM

## 2023-11-13 DIAGNOSIS — E03.9 HYPOTHYROIDISM, UNSPECIFIED TYPE: ICD-10-CM

## 2023-11-13 PROBLEM — O09.529: Status: ACTIVE | Noted: 2023-07-21

## 2023-11-13 PROBLEM — O09.529 SUPERVISION OF MULTIGRAVIDA OF ADVANCED MATERNAL AGE: Status: ACTIVE | Noted: 2023-07-21

## 2023-11-13 PROCEDURE — 3078F DIAST BP <80 MM HG: CPT | Performed by: OBSTETRICS & GYNECOLOGY

## 2023-11-13 PROCEDURE — 3074F SYST BP LT 130 MM HG: CPT | Performed by: OBSTETRICS & GYNECOLOGY

## 2023-11-13 PROCEDURE — 90471 IMMUNIZATION ADMIN: CPT | Performed by: OBSTETRICS & GYNECOLOGY

## 2023-11-13 PROCEDURE — 90686 IIV4 VACC NO PRSV 0.5 ML IM: CPT | Performed by: OBSTETRICS & GYNECOLOGY

## 2023-11-13 NOTE — PROGRESS NOTES
[id number] 25w0d seen for routine OB visit. Denies ctx, lof, vb. Reports good FM  C/o sciatic pain and hand numbness. Patient Active Problem List   Diagnosis    Type II diabetes mellitus, well controlled (HCC)    Rheumatoid factor positive    Osteoarthrosis    Obesity    Hyperproteinemia    Morning sickness    Iron deficiency anemia    Inflammatory polyarthropathy (HCC)    Hypothyroidism    Abnormal antibody titer    Supervision of multigravida of advanced maternal age    Medication exposure during first trimester of pregnancy    Spondylarthritis    Pre-existing essential hypertension during pregnancy    Hirsutism    H/O CS x2    Pre-existing type 2 diabetes mellitus during pregnancy, antepartum    Low-lying placenta        TW lb (7.711 kg)   Depression Scale Total: 0 (2023 12:17 PM)      Gen: AAOx3, NAD  Resp: breathing comfortably  Abdomen: gravid, soft, nontender  Ext: nontender, no edema    Plan:  - MFM records reviewed, next US   - type 2 DM per MFM  - continue Labetalol 200mg BID and low dose ASA  - CBC, TSH, and HIV ordered for 3T  - flu vaccine today  - PT referral  - return precautions reviewed    RTO in 3-4 week(s).

## 2023-11-14 DIAGNOSIS — O21.9 NAUSEA/VOMITING IN PREGNANCY: ICD-10-CM

## 2023-11-14 RX ORDER — DOXYLAMINE SUCCINATE AND PYRIDOXINE HYDROCHLORIDE, DELAYED RELEASE TABLETS 10 MG/10 MG 10; 10 MG/1; MG/1
2 TABLET, DELAYED RELEASE ORAL NIGHTLY
Qty: 240 TABLET | Refills: 0 | Status: SHIPPED | OUTPATIENT
Start: 2023-11-14

## 2023-11-14 NOTE — TELEPHONE ENCOUNTER
Last OV: 11/13/23  Last refill date: 8/24/2023 #240 with 0 refills  Follow-up: prenatal appointments  Next appt.: 12/5/23  Problem list reflects hyperemesis. Refill sent per protocol.

## 2023-12-05 ENCOUNTER — TELEPHONE (OUTPATIENT)
Dept: OBGYN CLINIC | Facility: CLINIC | Age: 38
End: 2023-12-05

## 2023-12-05 ENCOUNTER — ULTRASOUND ENCOUNTER (OUTPATIENT)
Dept: PERINATAL CARE | Facility: HOSPITAL | Age: 38
End: 2023-12-05
Attending: OBSTETRICS & GYNECOLOGY
Payer: COMMERCIAL

## 2023-12-05 ENCOUNTER — MED REC SCAN ONLY (OUTPATIENT)
Dept: OBGYN CLINIC | Facility: CLINIC | Age: 38
End: 2023-12-05

## 2023-12-05 ENCOUNTER — LAB ENCOUNTER (OUTPATIENT)
Dept: LAB | Age: 38
End: 2023-12-05
Attending: OBSTETRICS & GYNECOLOGY
Payer: COMMERCIAL

## 2023-12-05 ENCOUNTER — ROUTINE PRENATAL (OUTPATIENT)
Dept: OBGYN CLINIC | Facility: CLINIC | Age: 38
End: 2023-12-05
Payer: COMMERCIAL

## 2023-12-05 VITALS
WEIGHT: 207.5 LBS | BODY MASS INDEX: 38 KG/M2 | HEART RATE: 96 BPM | DIASTOLIC BLOOD PRESSURE: 72 MMHG | SYSTOLIC BLOOD PRESSURE: 112 MMHG

## 2023-12-05 VITALS
DIASTOLIC BLOOD PRESSURE: 68 MMHG | HEIGHT: 62 IN | SYSTOLIC BLOOD PRESSURE: 112 MMHG | HEART RATE: 93 BPM | BODY MASS INDEX: 37.91 KG/M2 | WEIGHT: 206 LBS

## 2023-12-05 DIAGNOSIS — O09.523 MULTIGRAVIDA OF ADVANCED MATERNAL AGE IN THIRD TRIMESTER: ICD-10-CM

## 2023-12-05 DIAGNOSIS — O16.3 HYPERTENSION AFFECTING PREGNANCY IN THIRD TRIMESTER: ICD-10-CM

## 2023-12-05 DIAGNOSIS — O24.119 PRE-EXISTING TYPE 2 DIABETES MELLITUS DURING PREGNANCY, ANTEPARTUM: ICD-10-CM

## 2023-12-05 DIAGNOSIS — Z34.90 PRENATAL CARE, ANTEPARTUM: Primary | ICD-10-CM

## 2023-12-05 DIAGNOSIS — E03.9 HYPOTHYROIDISM, UNSPECIFIED TYPE: ICD-10-CM

## 2023-12-05 DIAGNOSIS — Z23 NEED FOR VACCINATION: ICD-10-CM

## 2023-12-05 DIAGNOSIS — O09.522 ENCOUNTER FOR SUPERVISION OF MULTIGRAVIDA OF ADVANCED MATERNAL AGE IN SECOND TRIMESTER: ICD-10-CM

## 2023-12-05 DIAGNOSIS — O09.523 MULTIGRAVIDA OF ADVANCED MATERNAL AGE IN THIRD TRIMESTER: Primary | ICD-10-CM

## 2023-12-05 PROBLEM — Z30.2 REQUEST FOR STERILIZATION: Status: ACTIVE | Noted: 2023-12-05

## 2023-12-05 LAB
ERYTHROCYTE [DISTWIDTH] IN BLOOD BY AUTOMATED COUNT: 12.8 %
EST. AVERAGE GLUCOSE BLD GHB EST-MCNC: 146 MG/DL (ref 68–126)
HBA1C MFR BLD: 6.7 % (ref ?–5.7)
HCT VFR BLD AUTO: 31.5 %
HGB BLD-MCNC: 10.3 G/DL
MCH RBC QN AUTO: 28.4 PG (ref 26–34)
MCHC RBC AUTO-ENTMCNC: 32.7 G/DL (ref 31–37)
MCV RBC AUTO: 86.8 FL
PLATELET # BLD AUTO: 357 10(3)UL (ref 150–450)
RBC # BLD AUTO: 3.63 X10(6)UL
TSI SER-ACNC: 1.98 MIU/ML (ref 0.36–3.74)
WBC # BLD AUTO: 9.6 X10(3) UL (ref 4–11)

## 2023-12-05 PROCEDURE — 36415 COLL VENOUS BLD VENIPUNCTURE: CPT

## 2023-12-05 PROCEDURE — 85027 COMPLETE CBC AUTOMATED: CPT

## 2023-12-05 PROCEDURE — 83036 HEMOGLOBIN GLYCOSYLATED A1C: CPT

## 2023-12-05 PROCEDURE — 90715 TDAP VACCINE 7 YRS/> IM: CPT | Performed by: OBSTETRICS & GYNECOLOGY

## 2023-12-05 PROCEDURE — 3074F SYST BP LT 130 MM HG: CPT | Performed by: OBSTETRICS & GYNECOLOGY

## 2023-12-05 PROCEDURE — 3078F DIAST BP <80 MM HG: CPT | Performed by: OBSTETRICS & GYNECOLOGY

## 2023-12-05 PROCEDURE — 90471 IMMUNIZATION ADMIN: CPT | Performed by: OBSTETRICS & GYNECOLOGY

## 2023-12-05 PROCEDURE — 76816 OB US FOLLOW-UP PER FETUS: CPT | Performed by: OBSTETRICS & GYNECOLOGY

## 2023-12-05 PROCEDURE — 84443 ASSAY THYROID STIM HORMONE: CPT

## 2023-12-05 PROCEDURE — 87389 HIV-1 AG W/HIV-1&-2 AB AG IA: CPT

## 2023-12-05 NOTE — PROGRESS NOTES
Chief Complaint   Patient presents with    Prenatal Care     BOWEN     Routine prenatal visit. Patient without complaints. Good fetal movement  Patient denies any bleeding, leaking fluid, cramping, or contractions. Assessment/Plan:  28w1d doing well  HIV and follow up CBC done today. HgA1c added. AMA/DM/CHtn- had growth US MFM today. NST 32 wks  Previous CS x 2- request sent for RCS/BS at 38-39 wks. Understands BS permanent, irreversible. Medicaid consent form signed. Blood type A pos  Reviewed vaccine recommendations: Tdap done today. Flu done. RSV recommended. Kick count information given. Reviewed  labor signs and symptoms. Diagnoses and all orders for this visit:    Prenatal care, antepartum    Pre-existing type 2 diabetes mellitus during pregnancy, antepartum  -     Hemoglobin A1C; Future    Need for vaccination  -     TdaP (Boostrix/Adacel) Vaccine (> 7 Y)      Return in about 2 weeks (around 2023) for Routine Prenatal Visit, NST 32 wks.

## 2023-12-05 NOTE — PROGRESS NOTES
Pt here for Growth Ultrasound  +fm noted per patient  Pt noted intermittent H/A's, pt denies further complaints. Pt noted that she stopped taking her insulin for 1 1/2 wks d/t vomiting and diarrhea. Pt further noted she has been taking for last 4 days. Pt reminded of importance to keep diabetic flow sheet up to date for weekly doctor review.

## 2023-12-05 NOTE — PATIENT INSTRUCTIONS
LABOR & DELIVERY PRE-REGISTRATION    Thank you for choosing [facility] for you labor and delivery needs. We look forward to caring for you and your family in this exciting time. In order to better care for all of our patients, [facility] recommends that you complete your delivery registration as early in your pregnancy as possible. Registering for your delivery in advance saves you the time of doing so on your day of delivery and allows your care team to be better prepared for your delivery date. For more information about Labor and Delivery at [facility] and to pre-register, visit [address] [employer]--> Search Our Services-->Pregnancy & Baby. TWO WAYS TO REGISTER ONLINE    Epic MyChart allows access to multiple medical organizations, including [employer] and other medical organizations that use the [employer] system. To add [employer] or any other medical organization, just tap My Organizations at the top left corner of the login page in the [address] [employer] ally, and then click Add Organization. [facility] in [address] [place] [address] for your hospital stay through your [employer] account. After logging into [employer], click on Visits. Under Visits, click on [facility] for Delivery and follow the directions to register. You may print the confirmation page. If you do not already have a [employer] account, ask our office for an Access Code. Go to [employer]. [email or web address] and follow the prompts to set up your account. [facility] on [email or web address]- Pre-register for your hospital stay through the convenient online form on our website. Go to [email or web address]. Scroll down the page and click on [address] out all of the required information and click submit. You will receive a confirmation of your submission. Questions about registering for your hospital stay? Contact the [facility] & Delivery at [phone].       PRENATAL CLASSES    Both in-person and virtual classes are available. Weeknight and weekend classes are available. Go to [email or web address]   or   [email or web address]  Click on drop down menu on the right side  Scroll down to \"Find a Class or Support Group\"  Scroll down and click on \"[address]\"  Type any of the following in the Search Bar  - [employer]/ [employer]- for the [address] [place] [address] Street (even if you are planning on having an epidural). Topics include labor and birth, breathing techniques, epidurals, postpartum issues. - Virtual Childbirth Express  - Breastfeeding       FETAL MOVEMENT CHART    Although not all women need to perform daily fetal movement counts, if you notice a decrease in fetal activity, you should contact our office immediately. Begin counting fetal movements at 32 weeks of pregnancy. You may find that your baby is more active after eating or drinking. We want you to time how long it takes to feel 10 movements (kicks, flutters, swishes or rolls). You should feel at least 10 movements in 2 hours. You will likely feel 10 movements in less than 2 hours. If you have concerns, you can use the attached table to record movements. Record the time you feel the first movement and the tenth movement. This will help you observe patterns and discover how long it normally takes your baby to move 10 times. Please call us if any of the following occurs: You have not felt the baby move for a couple of hours  It is taking longer each day to get the tenth movement    The main point is we want you to know the characteristics of your baby, so you can tell the doctor or nurse if something different is happening. Again, if you notice a decrease in fetal movement, please give the office a call.     If our office is closed, the answering service will page the doctor on-call.    ------------------------------      Time SERVANDO [place] S Time M T W Th F S S                                                                                                           Time M T W Th F S S                                                                                                           Time M T W Th F S S                                                                                                         VACCINE RECOMMENDATIONS     [address] [facility] has significant outbreaks of pertussis (whooping cough) every year. Therefore, the CDC recommends that all pregnant women receive a Tdap vaccine during pregnancy, regardless of whether you have received one previously. The vaccine reduces your chances of darshana the illness, and also provides some natural immunity to your baby for possible exposures after birth. The best time to get the vaccine is between 27 and 36 weeks. Baby caregivers (grandparents, spouse) should also make sure they are up to date on the vaccine (within the last 10 years). Influenza- Pregnant women who develop the flu are more prone to serious complications that can affect both mother and baby. The CDC recommends that all women who will be pregnant during flu season (October to May) receive the flu vaccine (injection only, not nasal spray). The vaccine can be given during any stage of pregnancy. Both vaccines are offered in our office, as well as through many pharmacies and primary care offices. Covid-19 Vaccine   If you are pregnant or were recently pregnant, you are more likely to get very sick from COVID-19 than people who are not pregnant. Additionally, if you have COVID-19 during pregnancy, you are more likely to have complications that can affect your pregnancy and developing baby.   Please check the CDC website for the most up-to-date recommendations on Covid vaccination at www.cdc.gov/coronavirus/vaccine    COVID-19 vaccination is recommended for everyone ages 7 months and older, including people who are pregnant, breastfeeding, trying to get pregnant now, or who might become pregnant in the future. Evidence shows that COVID-19 vaccination before and during pregnancy is safe and effective and suggests that the benefits of vaccination outweigh any known or potential risks. New data show that vaccination during pregnancy can help protect babies younger than 7 months old, when they are too young to be vaccinated themselves, from hospitalization due to COVID-19. RSV Vaccine  RSV (Respiratory Syncytial Virus) is a common respiratory virus that causes cold like symptoms in adults and babies. Infants and adults over 61years old are more likely to develop severe RSV infection requiring hospitalization. A new RSV vaccine was released in October 2023 that if given during the third trimester of pregnancy, reduces your baby's risk of being hospitalized with RSV after birth by up to 80%. Therefore the CDC recommends that pregnant moms receive one dose of the RSV vaccine Pfizer Abrysvo sometime between 28 and 36 weeks of pregnancy, before or during RSV season (September through January). If you decide not to get the vaccine, you can talk to your pediatrician about your baby receiving the RSV antibody injection Beyfortus after birth.

## 2023-12-05 NOTE — TELEPHONE ENCOUNTER
----- Message from Shadia Brice MD sent at 2023 11:53 AM CST -----  Surgeon: AMBROSE COLLIER's    Date: 38-39 weeks    Type of Admit: Surgery Admit Inpatient    Procedure Location: L&D    PreOp Dx: Previous  section x 2 and desires sterilization    Procedure: Repeat  section and bilateral salpingectomy    Anesthesia: Spinal    Antibiotics: Initiate antibiotics per Gale Yoder adult preoperative prophylactic antibiotic protocol     Pre Op Orders: Please use [employer] Orders

## 2023-12-06 ENCOUNTER — TELEPHONE (OUTPATIENT)
Dept: PERINATAL CARE | Facility: HOSPITAL | Age: 38
End: 2023-12-06

## 2023-12-06 NOTE — TELEPHONE ENCOUNTER
28w2d  Received BS log for date range 11/29-12/5     Low  High Out of Range   Fasting Blood Sugar 89 105 5 out of 7   Post Breakfast 110 129 4 out of 7   Post Lunch 112 129 4 out of 6   Post Dinner 118 122 4 out of 6     Patient is currently taking Glargine 36 units AM, 60 units HS (split in 2)  Aspart 48 units Breakfast  Aspart 48 units Lunch  Aspart 48 units Dinner.   To Dr. Moon for review.

## 2023-12-06 NOTE — TELEPHONE ENCOUNTER
Glargine 44 units AM, 72 units HS (split in 2)  Aspart 56 units Breakfast (split in 2)  Aspart 56 units Lunch (split in 2)  Aspart 56 units Dinner. (split in 2)

## 2023-12-07 RX ORDER — INSULIN GLARGINE-YFGN 100 [IU]/ML
INJECTION, SOLUTION SUBCUTANEOUS
Qty: 15 ML | Refills: 3 | Status: SHIPPED | OUTPATIENT
Start: 2023-12-07

## 2023-12-18 ENCOUNTER — LAB ENCOUNTER (OUTPATIENT)
Dept: LAB | Age: 38
End: 2023-12-18
Attending: OBSTETRICS & GYNECOLOGY
Payer: COMMERCIAL

## 2023-12-18 ENCOUNTER — ROUTINE PRENATAL (OUTPATIENT)
Dept: OBGYN CLINIC | Facility: CLINIC | Age: 38
End: 2023-12-18
Payer: MEDICAID

## 2023-12-18 VITALS
HEART RATE: 92 BPM | DIASTOLIC BLOOD PRESSURE: 70 MMHG | WEIGHT: 210 LBS | SYSTOLIC BLOOD PRESSURE: 110 MMHG | BODY MASS INDEX: 38 KG/M2

## 2023-12-18 DIAGNOSIS — Z34.80 SUPERVISION OF OTHER NORMAL PREGNANCY, ANTEPARTUM: Primary | ICD-10-CM

## 2023-12-18 DIAGNOSIS — O99.210 OBESITY AFFECTING PREGNANCY, ANTEPARTUM, UNSPECIFIED OBESITY TYPE: ICD-10-CM

## 2023-12-18 DIAGNOSIS — O24.119 PRE-EXISTING TYPE 2 DIABETES MELLITUS DURING PREGNANCY, ANTEPARTUM: ICD-10-CM

## 2023-12-18 DIAGNOSIS — O09.891 MEDICATION EXPOSURE DURING FIRST TRIMESTER OF PREGNANCY: ICD-10-CM

## 2023-12-18 DIAGNOSIS — O09.529 ANTEPARTUM MULTIGRAVIDA OF ADVANCED MATERNAL AGE: ICD-10-CM

## 2023-12-18 DIAGNOSIS — Z98.891 HISTORY OF CESAREAN SECTION: ICD-10-CM

## 2023-12-18 DIAGNOSIS — O10.019 PRE-EXISTING ESSENTIAL HYPERTENSION DURING PREGNANCY, ANTEPARTUM: ICD-10-CM

## 2023-12-18 DIAGNOSIS — O44.40 LOW-LYING PLACENTA: ICD-10-CM

## 2023-12-18 PROCEDURE — 36415 COLL VENOUS BLD VENIPUNCTURE: CPT

## 2023-12-18 PROCEDURE — 80193 DRUG ASSAY LEFLUNOMIDE: CPT

## 2023-12-21 LAB — LEFLUNOMIDE METABOLITE: <0.01 MCG/ML

## 2023-12-22 NOTE — PROGRESS NOTES
Outpatient Maternal-Fetal Medicine Follow-Up     Dear Dr. Shepherd     Thank you for requesting ultrasound evaluation and maternal fetal medicine consultation on your patient Jenny Clark.  As you are aware she is a 38 year old female  with a taylor pregnancy and an Estimated Date of Delivery: 24.  She returned to maternal-fetal medicine today for a follow-up visit.  Her history was reviewed from her prior visit and there were no interval changes.     Antepartum Risk Factors  AMA -  NORMAL CVS results  Likely type II diabetes  CHTN  First trimester medication (leflunomide) exposure  Hypothyroidism  Spondyloarthritis  Low-Lying Placenta     PHYSICAL EXAMINATION:  LMP 2023   General: alert and oriented, no acute distress  Abdomen: gravid, soft, non-tender  Extremities: non-tender, no edema        DISCUSSION  During her visit we discussed and reviewed the following issues:  DIABETES MELLITUS  See prior M notes for a detailed review.    Glucose Control:  The patient's capillary blood glucose records were reviewed today; her compliance with the recommended four times daily assessments (fasting and two-hour post-prandial) is adquate   Her overall glucose control is adequate     Medical Regimen:     Glargine 44 units AM, 72 units HS (split in 2)  Aspart 56 units Breakfast (split in 2)  Aspart 56 units Lunch (split in 2)  Aspart 56 units Dinner. (split in 2)     NO CHANGE    ADVANCED MATERNAL AGE  See prior MFM notes for a detailed review.  She has already obtained a NORMAL CVS result     HYPERTENSION - follow-up     BP Review  Anti-Hypertensive medications:  Labetalol 200 mg two times daily  Home BP's: 100-110/70-80  Her blood pressure control is adequate.  Medical Regimen Recommendation: no change.     Continued medication use and home blood pressure assessments were reviewed as well as recommendations for serial growth ultrasounds and antepartum testing.     LOW-LYING PLACENTA FOLLOW-UP    The  patient was previously evaluated and the placenta was low-lying.  If the inferior edge of the placenta is greater than 2 cm from the internal os the patient is a candidate for vaginal trial of labor.  When the placenta is within 2 cm of the internal os there is an added risk of bleeding during labor for patients to attempt vaginal delivery.  Hence, if the placenta remains less than 2 cm from the internal os continued surveillance of the placental location is advised until it has migrated more than 2 cm from the internal os.  Hence, a follow-up scan for placental location was advised for which the patient returns today.    On today's scan the placenta  was not low-lying.      OB ULTRASOUND REPORT   See imaging tab for complete ultrasound report or in PACS     Ultrasound Findings:  Single IUP in cephalic presentation.    Placenta is anterior.   A 3 vessel cord is noted.  Cardiac activity is present at 144 bpm  EFW 2165 g ( 4 lb 12 oz); 64%.    SUNSHINE is  9.8 cm.  MVP is 3.2 cm  BPP is 8/8.     The fetal measurements are consistent with established EDC. No gross ultrasound evidence of structural abnormalities are seen today. The patient understands that ultrasound cannot rule out all structural and chromosomal abnormalities.      IMPRESSION:   IUP @  32w1d  Likely type II diabetes  AMA - NORMAL CVS results  Low lying placenta  Chronic hypertension  First trimester medication (leflunomide) exposure  Hypothyroidism  Spondyloarthritis     RECOMMENDATIONS:  Continue care with  Dr. Chua  Home blood pressure monitoring / Ob  review of blood pressure values  Check glucoses 4 times a day and report them weekly to [facility]   Growth ultrasound q4 weeks in 3rd trimester with BPP at or beyond 32 weeks  Weekly NST's at 32 weeks.  Delivery at 38-39w for chronic hypertension and likely type 2 diabetes  Low-dose aspirin 81 to 120 mg daily until 37 weeks.       Thank you for allowing me to participate in the care of your patient.  Please do  not hesitate to contact me if additional questions or concerns arise.       Toni Carty M.D.  Maternal-Fetal Medicine     30 minutes spent in review of records, patient consultation, documentation and coordination of care.  The relevant clinical matter(s) are summarized above.      Note to patient and family  The 21st Century Cures Act makes medical notes available to patients in the interest of transparency.  However, please be advised that this is a medical document.  It is intended as azep-id-xjzl communication.  It is written and medical language may contain abbreviations or verbiage that are technical and unfamiliar.  It may appear blunt or direct.  Medical documents are intended to carry relevant information, facts as evident, and the clinical opinion of the practitioner.

## 2024-01-02 ENCOUNTER — OFFICE VISIT (OUTPATIENT)
Dept: PERINATAL CARE | Facility: HOSPITAL | Age: 39
End: 2024-01-02
Attending: OBSTETRICS & GYNECOLOGY
Payer: COMMERCIAL

## 2024-01-02 VITALS
HEIGHT: 62 IN | WEIGHT: 212 LBS | HEART RATE: 93 BPM | DIASTOLIC BLOOD PRESSURE: 83 MMHG | BODY MASS INDEX: 39.01 KG/M2 | SYSTOLIC BLOOD PRESSURE: 125 MMHG

## 2024-01-02 DIAGNOSIS — O09.891 MEDICATION EXPOSURE DURING FIRST TRIMESTER OF PREGNANCY: ICD-10-CM

## 2024-01-02 DIAGNOSIS — O09.529 AMA (ADVANCED MATERNAL AGE) MULTIGRAVIDA 35+: ICD-10-CM

## 2024-01-02 DIAGNOSIS — O09.523 MULTIGRAVIDA OF ADVANCED MATERNAL AGE IN THIRD TRIMESTER: ICD-10-CM

## 2024-01-02 DIAGNOSIS — E11.9 TYPE II DIABETES MELLITUS, WELL CONTROLLED (HCC): ICD-10-CM

## 2024-01-02 DIAGNOSIS — O24.119 PRE-EXISTING TYPE 2 DIABETES MELLITUS DURING PREGNANCY, ANTEPARTUM: ICD-10-CM

## 2024-01-02 DIAGNOSIS — O16.3 HYPERTENSION AFFECTING PREGNANCY IN THIRD TRIMESTER: ICD-10-CM

## 2024-01-02 DIAGNOSIS — O09.523 MULTIGRAVIDA OF ADVANCED MATERNAL AGE IN THIRD TRIMESTER: Primary | ICD-10-CM

## 2024-01-02 PROCEDURE — 76819 FETAL BIOPHYS PROFIL W/O NST: CPT

## 2024-01-02 PROCEDURE — 76816 OB US FOLLOW-UP PER FETUS: CPT | Performed by: OBSTETRICS & GYNECOLOGY

## 2024-01-02 NOTE — PROGRESS NOTES
Pt here for growth ultrasound  + FM  Pt c/o pink tinged watery discharge on toilet paper and in her underwear that soaks through to her pants x 1 month. Pt states OB provider is aware. Pt states feeling occas uterine cramping.

## 2024-01-03 ENCOUNTER — APPOINTMENT (OUTPATIENT)
Dept: OBGYN CLINIC | Facility: CLINIC | Age: 39
End: 2024-01-03
Payer: MEDICAID

## 2024-01-03 ENCOUNTER — ROUTINE PRENATAL (OUTPATIENT)
Dept: OBGYN CLINIC | Facility: CLINIC | Age: 39
End: 2024-01-03
Payer: MEDICAID

## 2024-01-03 VITALS
WEIGHT: 212 LBS | HEART RATE: 91 BPM | BODY MASS INDEX: 39 KG/M2 | DIASTOLIC BLOOD PRESSURE: 68 MMHG | SYSTOLIC BLOOD PRESSURE: 108 MMHG

## 2024-01-03 DIAGNOSIS — O99.210 OBESITY AFFECTING PREGNANCY, ANTEPARTUM, UNSPECIFIED OBESITY TYPE: ICD-10-CM

## 2024-01-03 DIAGNOSIS — O10.019 PRE-EXISTING ESSENTIAL HYPERTENSION DURING PREGNANCY, ANTEPARTUM: ICD-10-CM

## 2024-01-03 DIAGNOSIS — O24.119 PRE-EXISTING TYPE 2 DIABETES MELLITUS DURING PREGNANCY, ANTEPARTUM: ICD-10-CM

## 2024-01-03 DIAGNOSIS — O09.529 ANTEPARTUM MULTIGRAVIDA OF ADVANCED MATERNAL AGE: ICD-10-CM

## 2024-01-03 DIAGNOSIS — Z34.90 PRENATAL CARE, ANTEPARTUM: Primary | ICD-10-CM

## 2024-01-03 PROCEDURE — 3078F DIAST BP <80 MM HG: CPT | Performed by: OBSTETRICS & GYNECOLOGY

## 2024-01-03 PROCEDURE — 3074F SYST BP LT 130 MM HG: CPT | Performed by: OBSTETRICS & GYNECOLOGY

## 2024-01-03 PROCEDURE — 59025 FETAL NON-STRESS TEST: CPT | Performed by: OBSTETRICS & GYNECOLOGY

## 2024-01-03 RX ORDER — INSULIN DETEMIR 100 [IU]/ML
INJECTION, SOLUTION SUBCUTANEOUS
COMMUNITY
Start: 2023-12-31

## 2024-01-03 NOTE — PATIENT INSTRUCTIONS
LABOR & DELIVERY PRE-REGISTRATION    Thank you for choosing [facility] for you labor and delivery needs.  We look forward to caring for you and your family in this exciting time.  In order to better care for all of our patients, [facility] recommends that you complete your delivery registration as early in your pregnancy as possible.  Registering for your delivery in advance saves you the time of doing so on your day of delivery and allows your care team to be better prepared for your delivery date.  For more information about Labor and Delivery at [facility] and to pre-register, visit [email or web address]--> Search Our Services-->Pregnancy & Baby.    TWO WAYS TO REGISTER ONLINE    Epic MyChart allows access to multiple medical organizations, including [facility] and other medical organizations that use the Epic electronic health record system.  To add [facility] or any other medical organization, just tap My Organizations at the top left corner of the login page in the [employer] ally, and then click Add Organization.  [place] in [employer]- Pre-register for your hospital stay through your [employer] account.  After logging into [employer], click on Visits.  Under Visits, click on [place] for Delivery and follow the directions to register.  You may print the confirmation page.  If you do not already have a [employer] account, ask our office for an Access Code.  Go to [email or web address] and follow the prompts to set up your account.  [place] [facility] [email or web address]- Pre-register for your hospital stay through the convenient online form on our website.  Go to [email or web address].  Scroll down the page and click on [facility].  Fill out all of the required information and click submit.  You will receive a confirmation of your submission.  Questions about registering for your hospital stay?  Contact the [facility]-Labor & Delivery at [phone].      PRENATAL CLASSES    Both in-person and  virtual classes are available.  Weeknight and weekend classes are available.  Go to [email or web address]   or   [email or web address]  Click on drop down menu on the right side  Scroll down to \"Find a Class or Support Group\"  Scroll down and click on \"[facility]\"  Type any of the following in the Search Bar  - Babycare  - Virtual Tour- for the [facility]  - Natural Childbirth (even if you are planning on having an epidural).  Topics include labor and birth, breathing techniques, epidurals, postpartum issues.  - Virtual Childbirth Express  - Breastfeeding       FETAL MOVEMENT CHART    Although not all women need to perform daily fetal movement counts, if you notice a decrease in fetal activity, you should contact our office immediately.      Begin counting fetal movements at 32 weeks of pregnancy.  You may find that your baby is more active after eating or drinking.       We want you to time how long it takes to feel 10 movements (kicks, flutters, swishes or rolls).  You should feel at least 10 movements in 2 hours.  You will likely feel 10 movements in less than 2 hours.    If you have concerns, you can use the attached table to record movements.  Record the time you feel the first movement and the tenth movement.  This will help you observe patterns and discover how long it normally takes your baby to move 10 times.       Please call us if any of the following occurs:  You have not felt the baby move for a couple of hours  It is taking longer each day to get the tenth movement    The main point is we want you to know the characteristics of your baby, so you can tell the doctor or nurse if something different is happening.    Again, if you notice a decrease in fetal movement, please give the office a call.    If our office is closed, the answering service will page the doctor on-call.    ------------------------------      Time M T W Th F S S                                                                                                                  Time M T W Th F S S                                                                                                           Time M T W Th F S S                                                                                                           Time M T W Th F S S                                                                                                         VACCINE RECOMMENDATIONS     Pertussis- [place] has significant outbreaks of pertussis (whooping cough) every year.  Therefore, the CDC recommends that all pregnant women receive a Tdap vaccine during pregnancy, regardless of whether you have received one previously.  The vaccine reduces your chances of darshana the illness, and also provides some natural immunity to your baby for possible exposures after birth.  The best time to get the vaccine is between 27 and 36 weeks.  Baby caregivers (grandparents, spouse) should also make sure they are up to date on the vaccine (within the last 10 years).     Influenza- Pregnant women who develop the flu are more prone to serious complications that can affect both mother and baby.  The CDC recommends that all women who will be pregnant during flu season (October to May) receive the flu vaccine (injection only, not nasal spray).  The vaccine can be given during any stage of pregnancy.     Both vaccines are offered in our office, as well as through many pharmacies and primary care offices.    Covid-19 Vaccine   If you are pregnant or were recently pregnant, you are more likely to get very sick from COVID-19 than people who are not pregnant. Additionally, if you have COVID-19 during pregnancy, you are more likely to have complications that can affect your pregnancy and developing baby.  Please check the CDC website for the most up-to-date recommendations on Covid vaccination at www.cdc.gov/coronavirus/vaccine    COVID-19  vaccination is recommended for everyone ages 6 months and older, including people who are pregnant, breastfeeding, trying to get pregnant now, or who might become pregnant in the future. Evidence shows that COVID-19 vaccination before and during pregnancy is safe and effective and suggests that the benefits of vaccination outweigh any known or potential risks. New data show that vaccination during pregnancy can help protect babies younger than 6 months old, when they are too young to be vaccinated themselves, from hospitalization due to COVID-19.    RSV Vaccine  RSV (Respiratory Syncytial Virus) is a common respiratory virus that causes cold like symptoms in adults and babies.  Infants and adults over 60 years old are more likely to develop severe RSV infection requiring hospitalization.  A new RSV vaccine was released in October 2023 that if given during the third trimester of pregnancy, reduces your baby's risk of being hospitalized with RSV after birth by up to 80%.  Therefore the CDC recommends that pregnant moms receive one dose of the RSV vaccine Pfizer Abrysvo sometime between 32 and 36 weeks of pregnancy, before or during RSV season (September through January).  If you decide not to get the vaccine, you can talk to your pediatrician about your baby receiving the RSV antibody injection Beyfortus after birth.

## 2024-01-03 NOTE — PROGRESS NOTES
Chief Complaint   Patient presents with    Prenatal Care     BOWEN       38 year old  at 32w2d who presents for routine OB visit without complaints. Doing well.   Patient denies any bleeding, leaking fluid, cramping, or contractions.  Assessment/Plan:   32w2d doing well.  Continue routine prenatal care  Kick counts reminded  AMA- weekly NST  Hypothyroid- last TSH nl 23  Prev CS- plan RCS/BS 2/15/23  Htn/DM- BP's stable and BS's doing well.  Reviewed  labor signs and symptoms.  Reviewed vaccine recommendations:  Tdap and flu done.  RSV encouraged.  Diagnoses and all orders for this visit:    Prenatal care, antepartum    Antepartum multigravida of advanced maternal age  -     FETAL NON-STRESS TEST EMG ONLY 59252; Future    Obesity affecting pregnancy, antepartum, unspecified obesity type  -     FETAL NON-STRESS TEST EMG ONLY 51584; Future    Pre-existing type 2 diabetes mellitus during pregnancy, antepartum  -     FETAL NON-STRESS TEST EMG ONLY 63783; Future    Pre-existing essential hypertension during pregnancy, antepartum  -     FETAL NON-STRESS TEST EMG ONLY 62048; Future      Return in about 1 week (around 1/10/2024) for Routine Prenatal Visit, NST.     Subjective:   Review of Systems:  General: no complaints per category. See HPI for additional information.   Breast: no complaints per category. See HPI for additional information.   Respiratory: no complaints per category. See HPI for additional information.   Cardiovascular: no complaints per category. See HPI for additional information.   GI: no complaints per category. See HPI for additional information.   : no complaints per category. See HPI for additional information.   Heme: no complaints per category. See HPI for additional information.   Obstetrical History:   OB History    Para Term  AB Living   3 2 2     2   SAB IAB Ectopic Multiple Live Births           2      # Outcome Date GA Lbr Joni/2nd Weight Sex Delivery Anes PTL  Lv   3 Current            2 Term 12/27/06   10 lb 10 oz (4.819 kg) F CS-Unspec EPI  THERESA   1 Term 02/03/03   9 lb 5 oz (4.224 kg) F CS-Unspec EPI N THERESA     Gynecological History: na  Medications:   LEVEMIR FLEXPEN 100 UNIT/ML Subcutaneous Solution Pen-injector       insulin aspart 100 Units/mL Subcutaneous Solution Pen-injector Inject 56 Units into the skin daily with breakfast AND 56 Units daily with lunch AND 56 Units daily with dinner. 15 mL 3    Insulin Glargine-yfgn (SEMGLEE, YFGN,) 100 UNIT/ML Subcutaneous Solution Pen-injector Inject 44 Units into the skin every morning AND 72 Units at bedtime. Use a new needle with each injection. Split nightly dose into 2 separate injections of 36 units each at 2 separate sites.. 15 mL 3    doxylamine-pyridoxine 10-10 MG Oral Tab EC Take 2 tablets by mouth nightly. Can take an additional tablet in the morning and another at noon 240 tablet 0    Microlet Lancets Does not apply Misc Check blood sugar 4 times daily 400 each 3    docusate sodium (COLACE) 100 MG Oral Cap Take 1 capsule every day by oral route as needed for 30 days.      Polyethylene Glycol 3350 (MIRALAX OR) as needed.      Prenatal MV-Min-Fe Fum-FA-DHA (PRENATAL 1 OR) Take by mouth.      Insulin Pen Needle 32G X 4 MM Does not apply Misc May substitute if not on insurance formulary 100 each 5    labetalol 200 MG Oral Tab Take 1 tablet (200 mg total) by mouth 2 (two) times daily.      ondansetron (ZOFRAN) 4 mg tablet Take 1 tablet (4 mg total) by mouth every 8 (eight) hours as needed for Nausea. 30 tablet 3    Scopolamine 1.5mg TD patch 1mg/3days Place 1 patch onto the skin every third day. 10 patch 3    CONTOUR NEXT TEST In Vitro Strip 1 each by Other route 4 (four) times daily. 400 each 3    levothyroxine 100 MCG Oral Tab Take 1 tablet (100 mcg total) by mouth daily.        Allergies:  No Known Allergies  Past Medical History:  Past Medical History:   Diagnosis Date    Diabetes (HCC)     Essential hypertension  2022    Inflammatory arthritis     Thyroid disease      Past Surgical History:  Past Surgical History:   Procedure Laterality Date    APPENDECTOMY      HC  SECTION LEVEL I       Immunizations:   Immunization History   Administered Date(s) Administered    >=3 YRS TRI  MULTIDOSE VIAL (83110) FLU CLINIC 2013    Covid-19 Vaccine Moderna 100 mcg/0.5 ml 2021    Covid-19 Vaccine Pfizer 30 mcg/0.3 ml 2021, 10/11/2021    FLUZONE 6 months and older PFS 0.5 ml (38417) 10/21/2021, 2022, 2023    HEP B, Adult 2015, 2015, 2015    Hep B, Unspecified Formulation 2021    MMR 2015, 2021    RHO(D) Immune Globulin 2015, 2015, 2015    TDAP 2015, 2023     Objective:     Vitals:    24 1034   BP: 108/68   Pulse: 91   Weight: 212 lb (96.2 kg)     Body mass index is 38.78 kg/m².  Physical Examination:  General appearance: Well dressed, well nourished in no apparent distress  Neurologic/Psychiatric: Alert and oriented to person, place and time, mood normal, affect appropriate  Head: Normocephalic without obvious deformity, atraumatic  Neck: No thyromegaly, no masses  Abdomen: Soft, non-tender, non-distended  Extremities: Full range of motion, no clubbing, cyanosis or edema  Skin: General inspection- no rashes, lesions or discoloration  Patient Active Problem List    Diagnosis    Supervision of other normal pregnancy, antepartum    Request for sterilization     [ X] Medicaid consent form signed 23      Pre-existing type 2 diabetes mellitus during pregnancy, antepartum     [ x ] Check early HgA1c- 6.8 and glucola- 211--> referral to diabetic educator.      Low-lying placenta     10/2023.  Still low lying 28 wks.        H/O CS x2     Request for RCS/BS 38-39 weeks sent 23  Plan two MD's- 2/15/24 8:30 am      Antepartum multigravida of advanced maternal age     CVS Nl @ [facility]      Medication exposure during  first trimester of pregnancy     Leflunomide Class X- early MFM consult  Rheumatologist Dr. Frances Dunn- cholestyramine given 6 weeks to reduce absorption?.       Spondylarthritis    Pre-existing essential hypertension during pregnancy     [ x ] Check baseline CMP, urine protein  Labetalol 200 BID   Home blood pressure monitoring  Level 2 ultrasound at 20 weeks- Nl  Fetal echo at 24 weeks  Growth ultrasound q4 weeks in 3rd trimester with BPP at or beyond 32 weeks  Weekly NST's at 32 weeks.  Delivery at 38-39w for chronic hypertension and likely type 2 diabetes  Low-dose aspirin 81 to 120 mg daily until 37 weeks.  This could be started after the CVS.        Rheumatoid factor positive    Morning sickness     Taking diclegis, zofran, scopalamine patch      Inflammatory polyarthropathy (HCC)    Iron deficiency anemia    Type II diabetes mellitus, well controlled (HCC)    Osteoarthrosis    Abnormal antibody titer     Unclear history.  Patient now aware of any antibody issues.  [x  ] Check Ab screen with initial prenatal labs- Nl      Hyperproteinemia    Hypothyroidism     TSH 4.73 on 7/19/23.  Synthroid dosage increased to 100 mcg  [ x ] rechecked with prenatal labs  0.487 on 8/24/23, 1.6 on 10/16/23. 1.98 on 12/5/23      Hirsutism    Obesity affecting pregnancy, antepartum     Total patient time was 30 minutes in reviewing paper/electronic records, reviewing test results from outside care provider, obtaining history, evaluating the patient, consultation, discussing treatment options, coordination of care and completing documentation. This included face to face and non-face to face actions. The patient's questions and concerns were addressed.

## 2024-01-09 ENCOUNTER — ROUTINE PRENATAL (OUTPATIENT)
Dept: OBGYN CLINIC | Facility: CLINIC | Age: 39
End: 2024-01-09
Payer: MEDICAID

## 2024-01-09 ENCOUNTER — APPOINTMENT (OUTPATIENT)
Dept: OBGYN CLINIC | Facility: CLINIC | Age: 39
End: 2024-01-09
Payer: MEDICAID

## 2024-01-09 VITALS — BODY MASS INDEX: 39 KG/M2 | WEIGHT: 211 LBS | DIASTOLIC BLOOD PRESSURE: 64 MMHG | SYSTOLIC BLOOD PRESSURE: 116 MMHG

## 2024-01-09 DIAGNOSIS — O99.210 OBESITY AFFECTING PREGNANCY, ANTEPARTUM, UNSPECIFIED OBESITY TYPE: ICD-10-CM

## 2024-01-09 DIAGNOSIS — Z34.90 PRENATAL CARE, ANTEPARTUM: Primary | ICD-10-CM

## 2024-01-09 DIAGNOSIS — O09.529 ANTEPARTUM MULTIGRAVIDA OF ADVANCED MATERNAL AGE: ICD-10-CM

## 2024-01-09 PROCEDURE — 3078F DIAST BP <80 MM HG: CPT | Performed by: OBSTETRICS & GYNECOLOGY

## 2024-01-09 PROCEDURE — 3074F SYST BP LT 130 MM HG: CPT | Performed by: OBSTETRICS & GYNECOLOGY

## 2024-01-09 PROCEDURE — 59025 FETAL NON-STRESS TEST: CPT | Performed by: OBSTETRICS & GYNECOLOGY

## 2024-01-09 NOTE — PATIENT INSTRUCTIONS
FETAL MOVEMENT CHART    Although not all women need to perform daily fetal movement counts, if you notice a decrease in fetal activity, you should contact our office immediately.      Begin counting fetal movements at 32 weeks of pregnancy.  You may find that your baby is more active after eating or drinking.       We want you to time how long it takes to feel 10 movements (kicks, flutters, swishes or rolls).  You should feel at least 10 movements in 2 hours.  You will likely feel 10 movements in less than 2 hours.    If you have concerns, you can use the attached table to record movements.  Record the time you feel the first movement and the tenth movement.  This will help you observe patterns and discover how long it normally takes your baby to move 10 times.       Please call us if any of the following occurs:  You have not felt the baby move for a couple of hours  It is taking longer each day to get the tenth movement    The main point is we want you to know the characteristics of your baby, so you can tell the doctor or nurse if something different is happening.    Again, if you notice a decrease in fetal movement, please give the office a call.    If our office is closed, the answering service will page the doctor on-call.    ------------------------------      Time M T W Th F S S                                                                                                                 Time M T W Th F S S                                                                                                           Time M T W Th F S S                                                                                                           Time M T W Th F S S

## 2024-01-15 ENCOUNTER — APPOINTMENT (OUTPATIENT)
Dept: OBGYN CLINIC | Facility: CLINIC | Age: 39
End: 2024-01-15
Payer: COMMERCIAL

## 2024-01-15 ENCOUNTER — ROUTINE PRENATAL (OUTPATIENT)
Dept: OBGYN CLINIC | Facility: CLINIC | Age: 39
End: 2024-01-15
Payer: COMMERCIAL

## 2024-01-15 VITALS
WEIGHT: 212.63 LBS | HEART RATE: 102 BPM | SYSTOLIC BLOOD PRESSURE: 124 MMHG | DIASTOLIC BLOOD PRESSURE: 80 MMHG | BODY MASS INDEX: 39 KG/M2

## 2024-01-15 DIAGNOSIS — O10.013 PRE-EXISTING ESSENTIAL HYPERTENSION DURING PREGNANCY IN THIRD TRIMESTER: ICD-10-CM

## 2024-01-15 DIAGNOSIS — O09.529 ANTEPARTUM MULTIGRAVIDA OF ADVANCED MATERNAL AGE: ICD-10-CM

## 2024-01-15 DIAGNOSIS — O24.119 PRE-EXISTING TYPE 2 DIABETES MELLITUS DURING PREGNANCY, ANTEPARTUM: ICD-10-CM

## 2024-01-15 DIAGNOSIS — O09.93 SUPERVISION OF HIGH RISK PREGNANCY IN THIRD TRIMESTER: Primary | ICD-10-CM

## 2024-01-15 NOTE — PROGRESS NOTES
[facility]  Obstetrics and Gynecology  Routine OB visit Progress Note    Subjective:     Jenny Clark is a 38 year old  at 34w0d who presents for routine OB visit.  Patient reports doing well.  Denies contractions, vaginal bleeding or leakage of fluid.  Good fetal movement.    Review of Systems:  General: no complaints per category. See HPI for additional information.   Breast: no complaints per category. See HPI for additional information.   Respiratory: no complaints per category. See HPI for additional information.   Cardiovascular: no complaints per category. See HPI for additional information.   GI: no complaints per category. See HPI for additional information.   : no complaints per category. See HPI for additional information.   Heme: no complaints per category. See HPI for additional information.     History/Other:     Obstetrical History:   OB History    Para Term  AB Living   3 2 2     2   SAB IAB Ectopic Multiple Live Births           2      # Outcome Date GA Lbr Joni/2nd Weight Sex Delivery Anes PTL Lv   3 Current            2 Term 06   10 lb 10 oz (4.819 kg) F CS-Unspec EPI  THERESA   1 Term 03   9 lb 5 oz (4.224 kg) F CS-Unspec EPI N THERESA         Gynecological History:     Patient's last menstrual period was 2023.      Medications:   LEVEMIR FLEXPEN 100 UNIT/ML Subcutaneous Solution Pen-injector       insulin aspart 100 Units/mL Subcutaneous Solution Pen-injector Inject 56 Units into the skin daily with breakfast AND 56 Units daily with lunch AND 56 Units daily with dinner. 15 mL 3    Insulin Glargine-yfgn (SEMGLEE, YFGN,) 100 UNIT/ML Subcutaneous Solution Pen-injector Inject 44 Units into the skin every morning AND 72 Units at bedtime. Use a new needle with each injection. Split nightly dose into 2 separate injections of 36 units each at 2 separate sites.. 15 mL 3    doxylamine-pyridoxine 10-10 MG Oral Tab EC Take 2 tablets by mouth nightly. Can take an  additional tablet in the morning and another at noon 240 tablet 0    Microlet Lancets Does not apply Misc Check blood sugar 4 times daily 400 each 3    docusate sodium (COLACE) 100 MG Oral Cap Take 1 capsule every day by oral route as needed for 30 days.      Polyethylene Glycol 3350 (MIRALAX OR) as needed.      Prenatal MV-Min-Fe Fum-FA-DHA (PRENATAL 1 OR) Take by mouth.      Insulin Pen Needle 32G X 4 MM Does not apply Misc May substitute if not on insurance formulary 100 each 5    labetalol 200 MG Oral Tab Take 1 tablet (200 mg total) by mouth 2 (two) times daily.      ondansetron (ZOFRAN) 4 mg tablet Take 1 tablet (4 mg total) by mouth every 8 (eight) hours as needed for Nausea. 30 tablet 3    Scopolamine 1.5mg TD patch 1mg/3days Place 1 patch onto the skin every third day. 10 patch 3    aspirin (ASPIRIN 81) 81 MG Oral Tab EC Take 1 tablet (81 mg total) by mouth daily. 90 tablet 3    CONTOUR NEXT TEST In Vitro Strip 1 each by Other route 4 (four) times daily. 400 each 3    levothyroxine 100 MCG Oral Tab Take 1 tablet (100 mcg total) by mouth daily.          Allergies:  No Known Allergies    Past Medical History:  Past Medical History:   Diagnosis Date    Diabetes (HCC)     Essential hypertension 2022    Inflammatory arthritis     Thyroid disease        Past Surgical History:  Past Surgical History:   Procedure Laterality Date    APPENDECTOMY      HC  SECTION LEVEL I         Immunizations:   Immunization History   Administered Date(s) Administered    >=3 YRS TRI  MULTIDOSE VIAL (55444) FLU CLINIC 2013    Covid-19 Vaccine Moderna 100 mcg/0.5 ml 2021    Covid-19 Vaccine Pfizer 30 mcg/0.3 ml 2021, 10/11/2021    FLUZONE 6 months and older PFS 0.5 ml (10539) 10/21/2021, 2022, 2023    HEP B, Adult 2015, 2015, 2015    Hep B, Unspecified Formulation 2021    MMR 2015, 2021    RHO(D) Immune Globulin 2015, 2015, 2015    TDAP  06/11/2015, 12/05/2023           Objective:     Objective:       Vitals:    01/15/24 1156   BP: 124/80   Pulse: 102   Weight: 212 lb 9.6 oz (96.4 kg)         Body mass index is 38.89 kg/m².    General: AAO.NAD.   CVS exam: normal peripheral perfusion  Chest: non-labored breathing, no tachypnea   Abdominal exam: gravid   Pelvic exam: deferred    NST - baseline 135, moderate variability, +15x15 accels, no decels  [place] - quiet     Labs:  Prenatal Results       Initial Prenatal Labs EDWARD (GA 0-24w)       Test Value Reference Range Date Time    ABO Group  A   08/24/23 1324    RH Type  Positive   08/24/23 1324    Antibody Screen OB  Negative   08/24/23 1324    Rubella Titer OB  Positive  Positive 10/16/23 1136    Hep B Surf Ag OB  Nonreactive  Nonreactive  08/24/23 1324    RPR Serology        TREP  Nonreactive  Nonreactive  08/24/23 1324    HIV Combo Result OB  Non-Reactive  Non-Reactive 08/24/23 1324    HGB  11.7 g/dL 12.0 - 16.0 08/24/23 1324       12.6 g/dL 12.0 - 16.0 07/04/23 1432    HCT  34.2 % 35.0 - 48.0 08/24/23 1324       38.4 % 35.0 - 48.0 07/04/23 1432    MCV  87.2 fL 80.0 - 100.0 08/24/23 1324       87.9 fL 80.0 - 100.0 07/04/23 1432    Platelets  356.0 10(3)uL 150.0 - 450.0 08/24/23 1324       357.0 10(3)uL 150.0 - 450.0 07/04/23 1432    Urine Culture  No Growth at 18-24 hrs.   08/24/23 1331    HCV  Nonreactive  Nonreactive  08/24/23 1324              Additional Prenatal Labs (GA 0-24w)       Test Value Reference Range Date Time    Chlamydia with Pap  Negative  Negative 08/24/23 1331    GC with Pap  Negative  Negative 08/24/23 1331    Chlamydia        GC         Pap Smear  Cytology Results: Negative for intraepithelial lesion or malignancy.   08/24/23 1331    HPV        Sickel Cell Solubility HGB                  2nd Trimester Labs (GA 24-41w)       Test Value Reference Range Date Time    Antibody Screen OB        Serology RPR        HGB        HCT        Glucose 1 hour  211 mg/dL See comment 08/07/23 1144     Glucose Pedro Luis 3 hr Gestational Fasting        1 Hour glucose        2 Hour glucose        3 hour glucose        Ferritin                  3rd Trimester (28-41w)       Test Value Reference Range Date Time    Blood Type        Antibody screen        Group B Strep OB        HGB  10.3 g/dL 12.0 - 16.0 23 1011    HCT  31.5 % 35.0 - 48.0 23 1011    HIV Combo Result OB  Non-Reactive  Non-Reactive 23 1011    Rapid HIV        Ferritin                  First Trimester & Genetic Testing (GA 0-40w)       Test Value Reference Range Date Time    MaternaT-21 (T13)        MaternaT-21 (T18)        MaternaT-21 T(T21)        VISIBILI T (T21)        VISIBILI T (T18)        QNatal T13        QNatal T18        QNatal T21        Cystic Fibrosis Screen [32]        Cystic Fibrosis Screen [165]        Cystic Fibrosis Screen [165]        Cystic Fibrosis Screen [165]        Cystic Fibrosis Screen [165]        RH d (Long Play)        CVS        Nuchal Screening        NIPT [T13]        NIPT [T18]        NIPT [T21]        Carrier Screen        First Trimester Screen                  Genetic Screening (GA 0-45w)       Test Value Reference Range Date Time    AFP Tetra-Patient's HCG        AFP Tetra-Mom for HCG        AFP Tetra-Patient's UE3        AFP Tetra-Mom for UE3        AFP Tetra-Patient's ALVARADO        AFP Tetra-Mom for ALVARADO        AFP Tetra-Patient's AFP        AFP Tetra-Mom for AFP        AFP. Spina Bifida        Quad Screen ([facility])        AFP        SMA                  Legend    ^: Historical                                Assessment:     Jenny Clark is a 38 year old  at 34w0d who presents for routine OB visit. Overall doing well.     Problem List Items Addressed This Visit          Cardiac and Vasculature    Pre-existing essential hypertension during pregnancy       Endocrine and Metabolic    Pre-existing type 2 diabetes mellitus during pregnancy, antepartum       Gravid and     Antepartum multigravida  of advanced maternal age    Supervision of high risk pregnancy in third trimester - Primary           Plan:     Patient Active Problem List    Diagnosis    Supervision of high risk pregnancy in third trimester    Request for sterilization     [ X] Medicaid consent form signed 12/5/23 1/8/24 undecided      Pre-existing type 2 diabetes mellitus during pregnancy, antepartum     [ x ] Check early HgA1c- 6.8 and glucola- 211--> referral to diabetic educator.      Low-lying placenta     10/2023.  Still low lying 28 wks.        H/O CS x2     RCS 38-39 wks 2/15/24.  Currently not scheduled for BS.  Undecided.  Plan two MD's- 2/15/24 8:30 am      Antepartum multigravida of advanced maternal age     CVS Nl @ [facility]      Medication exposure during first trimester of pregnancy     Leflunomide Class X- early MFM consult  Rheumatologist Dr. Frances Dunn- cholestyramine given 6 weeks to reduce absorption?.       Spondylarthritis    Pre-existing essential hypertension during pregnancy     [ x ] Check baseline CMP, urine protein  Labetalol 200 BID   Home blood pressure monitoring  Level 2 ultrasound at 20 weeks- Nl  Fetal echo at 24 weeks  Growth ultrasound q4 weeks in 3rd trimester with BPP at or beyond 32 weeks  Weekly NST's at 32 weeks.  Delivery at 38-39w for chronic hypertension and likely type 2 diabetes  Low-dose aspirin 81 to 120 mg daily until 37 weeks.  This could be started after the CVS.        Rheumatoid factor positive    Morning sickness     Taking diclegis, zofran, scopalamine patch      Inflammatory polyarthropathy (HCC)    Iron deficiency anemia    Type II diabetes mellitus, well controlled (HCC)    Osteoarthrosis    Abnormal antibody titer     Unclear history.  Patient now aware of any antibody issues.  [x  ] Check Ab screen with initial prenatal labs- Nl      Hyperproteinemia    Hypothyroidism     TSH 4.73 on 7/19/23.  Synthroid dosage increased to 100 mcg  [ x ] rechecked with prenatal labs  0.487  on 8/24/23, 1.6 on 10/16/23. 1.98 on 12/5/23      Hirsutism    Obesity affecting pregnancy, antepartum     - MFM f/u 1/30  - 3T labs neg  - s/p tdap and flu vaccines, RSV declined  - repeat CS scheduled 2/15  - continue routine prenatal care   - labor and rupture of membrane precautions provided    Return to clinic in 1 weeks for BOWEN/NST visit       All of the findings and plan were discussed with the patient.  She notes understanding and agrees with the plan of care.  All questions were answered to the best of my ability at this time.

## 2024-01-22 ENCOUNTER — APPOINTMENT (OUTPATIENT)
Dept: OBGYN CLINIC | Facility: CLINIC | Age: 39
End: 2024-01-22
Payer: MEDICAID

## 2024-01-22 ENCOUNTER — ROUTINE PRENATAL (OUTPATIENT)
Dept: OBGYN CLINIC | Facility: CLINIC | Age: 39
End: 2024-01-22
Payer: MEDICAID

## 2024-01-22 VITALS
BODY MASS INDEX: 39.2 KG/M2 | HEIGHT: 62 IN | DIASTOLIC BLOOD PRESSURE: 70 MMHG | WEIGHT: 213 LBS | SYSTOLIC BLOOD PRESSURE: 118 MMHG

## 2024-01-22 DIAGNOSIS — O24.119 PRE-EXISTING TYPE 2 DIABETES MELLITUS DURING PREGNANCY, ANTEPARTUM: ICD-10-CM

## 2024-01-22 DIAGNOSIS — O10.013 PRE-EXISTING ESSENTIAL HYPERTENSION DURING PREGNANCY IN THIRD TRIMESTER: ICD-10-CM

## 2024-01-22 DIAGNOSIS — O09.93 SUPERVISION OF HIGH RISK PREGNANCY IN THIRD TRIMESTER: Primary | ICD-10-CM

## 2024-01-22 DIAGNOSIS — O99.210 OBESITY AFFECTING PREGNANCY, ANTEPARTUM, UNSPECIFIED OBESITY TYPE: ICD-10-CM

## 2024-01-22 DIAGNOSIS — E03.9 HYPOTHYROIDISM, UNSPECIFIED TYPE: ICD-10-CM

## 2024-01-22 DIAGNOSIS — O09.529 ANTEPARTUM MULTIGRAVIDA OF ADVANCED MATERNAL AGE: ICD-10-CM

## 2024-01-22 NOTE — PROGRESS NOTES
BOWEN  Doing well, +FM  Denies VB/LOF/uctx  Mode of delivery: RCS scheduled  Labor precautions discussed  RTC 1 week with GBS  NST was reactive

## 2024-01-24 ENCOUNTER — ORDERS FOR ADMISSION (OUTPATIENT)
Dept: PERINATAL CARE | Facility: HOSPITAL | Age: 39
End: 2024-01-24

## 2024-01-24 RX ORDER — SODIUM CHLORIDE 9 MG/ML
25 INJECTION, SOLUTION INTRAVENOUS CONTINUOUS PRN
OUTPATIENT
Start: 2024-01-24

## 2024-01-24 RX ORDER — DEXTROSE MONOHYDRATE 25 G/50ML
50 INJECTION, SOLUTION INTRAVENOUS
OUTPATIENT
Start: 2024-01-24

## 2024-01-24 RX ORDER — DEXTROSE, SODIUM CHLORIDE, SODIUM LACTATE, POTASSIUM CHLORIDE, AND CALCIUM CHLORIDE 5; .6; .31; .03; .02 G/100ML; G/100ML; G/100ML; G/100ML; G/100ML
50 INJECTION, SOLUTION INTRAVENOUS CONTINUOUS PRN
OUTPATIENT
Start: 2024-01-24

## 2024-01-24 RX ORDER — SODIUM CHLORIDE, SODIUM LACTATE, POTASSIUM CHLORIDE, CALCIUM CHLORIDE 600; 310; 30; 20 MG/100ML; MG/100ML; MG/100ML; MG/100ML
INJECTION, SOLUTION INTRAVENOUS CONTINUOUS PRN
OUTPATIENT
Start: 2024-01-24

## 2024-01-24 RX ORDER — NICOTINE POLACRILEX 4 MG
30 LOZENGE BUCCAL
OUTPATIENT
Start: 2024-01-24

## 2024-01-24 RX ORDER — NICOTINE POLACRILEX 4 MG
15 LOZENGE BUCCAL
OUTPATIENT
Start: 2024-01-24

## 2024-01-30 ENCOUNTER — OFFICE VISIT (OUTPATIENT)
Dept: PERINATAL CARE | Facility: HOSPITAL | Age: 39
End: 2024-01-30
Attending: OBSTETRICS & GYNECOLOGY
Payer: COMMERCIAL

## 2024-01-30 VITALS
HEIGHT: 62 IN | BODY MASS INDEX: 39.2 KG/M2 | SYSTOLIC BLOOD PRESSURE: 132 MMHG | WEIGHT: 213 LBS | HEART RATE: 96 BPM | DIASTOLIC BLOOD PRESSURE: 83 MMHG

## 2024-01-30 DIAGNOSIS — O16.3 HYPERTENSION AFFECTING PREGNANCY IN THIRD TRIMESTER: ICD-10-CM

## 2024-01-30 DIAGNOSIS — E11.9 TYPE II DIABETES MELLITUS, WELL CONTROLLED (HCC): ICD-10-CM

## 2024-01-30 DIAGNOSIS — O99.210 OBESITY AFFECTING PREGNANCY, ANTEPARTUM: ICD-10-CM

## 2024-01-30 DIAGNOSIS — O09.523 MULTIGRAVIDA OF ADVANCED MATERNAL AGE IN THIRD TRIMESTER: ICD-10-CM

## 2024-01-30 DIAGNOSIS — O24.119 PRE-EXISTING TYPE 2 DIABETES MELLITUS DURING PREGNANCY, ANTEPARTUM: ICD-10-CM

## 2024-01-30 DIAGNOSIS — E66.09 OTHER OBESITY DUE TO EXCESS CALORIES AFFECTING PREGNANCY, ANTEPARTUM: ICD-10-CM

## 2024-01-30 DIAGNOSIS — O09.523 MULTIGRAVIDA OF ADVANCED MATERNAL AGE IN THIRD TRIMESTER: Primary | ICD-10-CM

## 2024-01-30 DIAGNOSIS — O09.891 MEDICATION EXPOSURE DURING FIRST TRIMESTER OF PREGNANCY: ICD-10-CM

## 2024-01-30 DIAGNOSIS — O99.210 OTHER OBESITY DUE TO EXCESS CALORIES AFFECTING PREGNANCY, ANTEPARTUM: ICD-10-CM

## 2024-01-30 PROCEDURE — 76819 FETAL BIOPHYS PROFIL W/O NST: CPT

## 2024-01-30 PROCEDURE — 76816 OB US FOLLOW-UP PER FETUS: CPT | Performed by: OBSTETRICS & GYNECOLOGY

## 2024-01-30 NOTE — PROGRESS NOTES
Outpatient Maternal-Fetal Medicine Follow-Up     Dear Dr. Shepherd     Thank you for requesting ultrasound evaluation and maternal fetal medicine consultation on your patient Jenny Clark.  As you are aware she is a 38 year old female  with a taylor pregnancy and an Estimated Date of Delivery: 24.  She returned to maternal-fetal medicine today for a follow-up visit.  Her history was reviewed from her prior visit and there were no interval changes.     Antepartum Risk Factors  AMA -  NORMAL CVS results  Likely type II diabetes, controlled on insulin  CHTN  First trimester medication (leflunomide) exposure  Hypothyroidism  Spondyloarthritis  2 prior C-sections; repeat  is scheduled for January 15, 2024    S:  Good fetal movement.  Doing well on the GDM diet and her insulin.  No concerns or complaints at this time.  Delivery is planned for February 15, 2024     PHYSICAL EXAMINATION:  /83 (BP Location: Right arm, Patient Position: Sitting, Cuff Size: adult)   Pulse 96   Ht 5' 2\" (1.575 m)   Wt 213 lb (96.6 kg)   LMP 2023   BMI 38.96 kg/m²   General: alert and oriented, no acute distress  Abdomen: gravid, soft, non-tender  Extremities: non-tender, no edema        DISCUSSION  During her visit we discussed and reviewed the following issues:  DIABETES MELLITUS  See prior MFM notes for a detailed review.    Glucose Control:  The patient's capillary blood glucose records were reviewed today up through today; her compliance with the recommended four times daily assessments (fasting and two-hour post-prandial) is adquate   Her overall glucose control is adequate     Medical Regimen:     Glargine 44 units AM, 72 units HS (split in 2)  Aspart 56 units Breakfast (split in 2)  Aspart 56 units Lunch (split in 2)  Aspart 56 units Dinner. (split in 2)     NO CHANGE    ADVANCED MATERNAL AGE  See prior MFM notes for a detailed review.  She has already obtained a NORMAL CVS result     HYPERTENSION  - follow-up     BP Review  Anti-Hypertensive medications:  Labetalol 200 mg two times daily  Home BP's: 110-120/70-80  Her blood pressure control is adequate.  Medical Regimen Recommendation: no change.     Continued medication use and home blood pressure assessments were reviewed as well as recommendations for serial growth ultrasounds and antepartum testing.     LOW-LYING PLACENTA FOLLOW-UP -resolved      OB ULTRASOUND REPORT   See imaging tab for complete ultrasound report or in PACS       Ultrasound Findings:  Single IUP in cephalic presentation.    Placenta is anterior.   A 3 vessel cord is noted.  Cardiac activity is present at 158 bpm  EFW 3408 g ( 7 lb 8 oz); 84%.  AC >99%  SUNSHINE is  15.6 cm.  MVP is 5 cm  BPP is 8/8.     The fetal measurements are consistent with established EDC. No gross ultrasound evidence of structural abnormalities are seen today. The patient understands that ultrasound cannot rule out all structural and chromosomal abnormalities.      IMPRESSION:   IUP @  36w1d  LGA fetal growth and reassuring  testing  Type II diabetes  AMA - NORMAL CVS results  Low lying placenta - RESOLVED  Chronic hypertension  First trimester medication (leflunomide) exposure  Hypothyroidism  Spondyloarthritis     RECOMMENDATIONS:  Continue care with  Dr. Chua  Home blood pressure monitoring / Ob  review of blood pressure values  Check glucoses 4 times a day and report them weekly to MFM   TWICE Weekly NST  Delivery at 38w for chronic hypertension and likely type 2 diabetes  Low-dose aspirin 81 to 120 mg daily until 37 weeks.       Thank you for allowing me to participate in the care of your patient.  Please do not hesitate to contact me if additional questions or concerns arise.       Kailee Garcia M.D.  Maternal-Fetal Medicine     30 minutes spent in review of records, patient consultation, documentation and coordination of care.  The relevant clinical matter(s) are summarized above.      Note to patient  and family  The 21st Century Cures Act makes medical notes available to patients in the interest of transparency.  However, please be advised that this is a medical document.  It is intended as juio-ms-synr communication.  It is written and medical language may contain abbreviations or verbiage that are technical and unfamiliar.  It may appear blunt or direct.  Medical documents are intended to carry relevant information, facts as evident, and the clinical opinion of the practitioner.

## 2024-01-31 ENCOUNTER — ROUTINE PRENATAL (OUTPATIENT)
Dept: OBGYN CLINIC | Facility: CLINIC | Age: 39
End: 2024-01-31
Payer: COMMERCIAL

## 2024-01-31 VITALS
SYSTOLIC BLOOD PRESSURE: 130 MMHG | HEART RATE: 89 BPM | DIASTOLIC BLOOD PRESSURE: 76 MMHG | WEIGHT: 214 LBS | BODY MASS INDEX: 39 KG/M2

## 2024-01-31 DIAGNOSIS — O09.93 SUPERVISION OF HIGH RISK PREGNANCY IN THIRD TRIMESTER: Primary | ICD-10-CM

## 2024-01-31 PROCEDURE — 3078F DIAST BP <80 MM HG: CPT | Performed by: NURSE PRACTITIONER

## 2024-01-31 PROCEDURE — 3075F SYST BP GE 130 - 139MM HG: CPT | Performed by: NURSE PRACTITIONER

## 2024-01-31 NOTE — PROGRESS NOTES
Chief Complaint   Patient presents with    Prenatal Care     BOWEN       38 year old  at 36w2d who presents for routine OB visit without complaints. Doing well.   Patient denies any bleeding, leaking fluid, cramping, or contractions.  Assessment/Plan:   36w2d doing well.  Continue routine prenatal care  Kick counts reminded  Reviewed  labor signs and symptoms.  Diagnoses and all orders for this visit:    Supervision of high risk pregnancy in third trimester  -     Strep B Culture; Future      Return for needs twice weekly NST- needs one added on this week.     Subjective:   Review of Systems:  General: no complaints per category. See HPI for additional information.   Breast: no complaints per category. See HPI for additional information.   Respiratory: no complaints per category. See HPI for additional information.   Cardiovascular: no complaints per category. See HPI for additional information.   GI: no complaints per category. See HPI for additional information.   : no complaints per category. See HPI for additional information.   Heme: no complaints per category. See HPI for additional information.   Obstetrical History:   OB History    Para Term  AB Living   3 2 2     2   SAB IAB Ectopic Multiple Live Births           2      # Outcome Date GA Lbr Joni/2nd Weight Sex Delivery Anes PTL Lv   3 Current            2 Term 06   10 lb 10 oz (4.819 kg) F CS-Unspec EPI  THERESA   1 Term /   9 lb 5 oz (4.224 kg) F CS-Unspec EPI N THERESA     Gynecological History: na  Medications:   LEVEMIR FLEXPEN 100 UNIT/ML Subcutaneous Solution Pen-injector       insulin aspart 100 Units/mL Subcutaneous Solution Pen-injector Inject 56 Units into the skin daily with breakfast AND 56 Units daily with lunch AND 56 Units daily with dinner. 15 mL 3    Insulin Glargine-yfgn (SEMGLEE, YFGN,) 100 UNIT/ML Subcutaneous Solution Pen-injector Inject 44 Units into the skin every morning AND 72 Units at bedtime. Use a new  needle with each injection. Split nightly dose into 2 separate injections of 36 units each at 2 separate sites.. 15 mL 3    Microlet Lancets Does not apply Misc Check blood sugar 4 times daily 400 each 3    Polyethylene Glycol 3350 (MIRALAX OR) as needed.      Prenatal MV-Min-Fe Fum-FA-DHA (PRENATAL 1 OR) Take by mouth.      Insulin Pen Needle 32G X 4 MM Does not apply Misc May substitute if not on insurance formulary 100 each 5    labetalol 200 MG Oral Tab Take 1 tablet (200 mg total) by mouth 2 (two) times daily.      aspirin (ASPIRIN 81) 81 MG Oral Tab EC Take 1 tablet (81 mg total) by mouth daily. 90 tablet 3    CONTOUR NEXT TEST In Vitro Strip 1 each by Other route 4 (four) times daily. 400 each 3    levothyroxine 100 MCG Oral Tab Take 1 tablet (100 mcg total) by mouth daily.        Allergies:  No Known Allergies  Past Medical History:  Past Medical History:   Diagnosis Date    Diabetes (HCC)     Essential hypertension 2022    Inflammatory arthritis     Thyroid disease      Past Surgical History:  Past Surgical History:   Procedure Laterality Date    APPENDECTOMY      HC  SECTION LEVEL I       Immunizations:   Immunization History   Administered Date(s) Administered    >=3 YRS TRI  MULTIDOSE VIAL (68304) FLU CLINIC 2013    Covid-19 Vaccine Moderna 100 mcg/0.5 ml 2021    Covid-19 Vaccine Pfizer 30 mcg/0.3 ml 2021, 10/11/2021    FLUZONE 6 months and older PFS 0.5 ml (02132) 10/21/2021, 2022, 2023    HEP B, Adult 2015, 2015, 2015    Hep B, Unspecified Formulation 2015, 2015, 2015, 2021    MMR 2015, 2021    RHO(D) Immune Globulin 2015, 2015, 2015    TDAP 2015, 2023     Objective:     Vitals:    24 1049   BP: 130/76   Pulse: 89   Weight: 214 lb (97.1 kg)     Body mass index is 39.14 kg/m².  Physical Examination:  General appearance: Well dressed, well nourished in no apparent  distress  Neurologic/Psychiatric: Alert and oriented to person, place and time, mood normal, affect appropriate  Abdomen: Soft, non-tender, non-distended-     Patient Active Problem List    Diagnosis    Supervision of high risk pregnancy in third trimester    Request for sterilization     [ X] Medicaid consent form signed 23 undecided      Pre-existing type 2 diabetes mellitus during pregnancy, antepartum     [ x ] Check early HgA1c- 6.8 and glucola- 211--> referral to diabetic educator.      Low-lying placenta- RESOLVED    H/O CS x2     RCS 38-39 wks 2/15/24.  Currently not scheduled for BS.  Undecided.  Plan two MD's- 2/15/24 8:30 am      Antepartum multigravida of advanced maternal age     CVS Nl @ [facility]      Medication exposure during first trimester of pregnancy     Leflunomide Class X- early MFM consult  Rheumatologist Dr. Frances Dunn- cholestyramine given 6 weeks to reduce absorption?.       Spondylarthritis    Pre-existing essential hypertension during pregnancy     [ x ] Check baseline CMP, urine protein  Labetalol 200 BID   Home blood pressure monitoring  Level 2 ultrasound at 20 weeks- Nl  Fetal echo at 24 weeks  Growth ultrasound q4 weeks in 3rd trimester with BPP at or beyond 32 weeks- 84% EFW, >99% AC 36 wks  Weekly NST's at 32 weeks.  Delivery at 38 w for chronic hypertension and likely type 2 diabetes  Low-dose aspirin 81 to 120 mg daily until 37 weeks.  This could be started after the CVS.        Rheumatoid factor positive    Morning sickness     Taking diclegis, zofran, scopalamine patch      Inflammatory polyarthropathy (HCC)    Iron deficiency anemia    Type II diabetes mellitus, well controlled (HCC)    Osteoarthrosis    Abnormal antibody titer     Unclear history.  Patient now aware of any antibody issues.  [x  ] Check Ab screen with initial prenatal labs- Nl      Hyperproteinemia    Hypothyroidism     TSH 4.73 on 23.  Synthroid dosage increased to 100  mcg  [ x ] rechecked with prenatal labs  0.487 on 8/24/23, 1.6 on 10/16/23. 1.98 on 12/5/23      Hirsutism    Obesity affecting pregnancy, antepartum   Total patient time was 20 minutes in reviewing paper/electronic records, reviewing test results from outside care provider, obtaining history, evaluating the patient, consultation, discussing treatment options, coordination of care and completing documentation. This included face to face and non-face to face actions. The patient's questions and concerns were addressed.

## 2024-02-01 ENCOUNTER — TELEPHONE (OUTPATIENT)
Dept: OBGYN UNIT | Facility: HOSPITAL | Age: 39
End: 2024-02-01

## 2024-02-01 ENCOUNTER — NURSE ONLY (OUTPATIENT)
Dept: OBGYN CLINIC | Facility: CLINIC | Age: 39
End: 2024-02-01
Payer: COMMERCIAL

## 2024-02-01 VITALS
WEIGHT: 213 LBS | HEART RATE: 99 BPM | DIASTOLIC BLOOD PRESSURE: 78 MMHG | HEIGHT: 62 IN | SYSTOLIC BLOOD PRESSURE: 124 MMHG | BODY MASS INDEX: 39.2 KG/M2

## 2024-02-01 DIAGNOSIS — O24.119 PRE-EXISTING TYPE 2 DIABETES MELLITUS DURING PREGNANCY, ANTEPARTUM: Primary | ICD-10-CM

## 2024-02-01 PROCEDURE — 3008F BODY MASS INDEX DOCD: CPT | Performed by: NURSE PRACTITIONER

## 2024-02-01 PROCEDURE — 59025 FETAL NON-STRESS TEST: CPT | Performed by: NURSE PRACTITIONER

## 2024-02-01 PROCEDURE — 3074F SYST BP LT 130 MM HG: CPT | Performed by: NURSE PRACTITIONER

## 2024-02-01 PROCEDURE — 3078F DIAST BP <80 MM HG: CPT | Performed by: NURSE PRACTITIONER

## 2024-02-02 LAB — GROUP B STREP BY PCR FOR PCR OVT: NEGATIVE

## 2024-02-05 ENCOUNTER — APPOINTMENT (OUTPATIENT)
Dept: OBGYN CLINIC | Facility: CLINIC | Age: 39
End: 2024-02-05
Payer: MEDICAID

## 2024-02-05 ENCOUNTER — ROUTINE PRENATAL (OUTPATIENT)
Dept: OBGYN CLINIC | Facility: CLINIC | Age: 39
End: 2024-02-05
Payer: MEDICAID

## 2024-02-05 VITALS
HEART RATE: 82 BPM | WEIGHT: 213.38 LBS | BODY MASS INDEX: 39 KG/M2 | DIASTOLIC BLOOD PRESSURE: 72 MMHG | SYSTOLIC BLOOD PRESSURE: 122 MMHG

## 2024-02-05 DIAGNOSIS — Z3A.37 37 WEEKS GESTATION OF PREGNANCY: Primary | ICD-10-CM

## 2024-02-05 NOTE — PROGRESS NOTES
[facility]  Obstetrics and Gynecology  Routine OB visit Progress Note    Subjective:     Jenny Clark is a 38 year old  at 37w0d who presents for routine OB visit.  Patient reports doing well.   Denies contractions, vaginal bleeding or leakage of fluid.   Good fetal movement.    Review of Systems:  General: no complaints per category. See HPI for additional information.   Breast: no complaints per category. See HPI for additional information.   Respiratory: no complaints per category. See HPI for additional information.   Cardiovascular: no complaints per category. See HPI for additional information.   GI: no complaints per category. See HPI for additional information.   : no complaints per category. See HPI for additional information.   Heme: no complaints per category. See HPI for additional information.     History/Other:     Obstetrical History:   OB History    Para Term  AB Living   3 2 2     2   SAB IAB Ectopic Multiple Live Births           2      # Outcome Date GA Lbr Joni/2nd Weight Sex Delivery Anes PTL Lv   3 Current            2 Term 06   10 lb 10 oz (4.819 kg) F CS-Unspec EPI  THERESA   1 Term 03   9 lb 5 oz (4.224 kg) F CS-Unspec EPI N THERESA         Gynecological History:     Patient's last menstrual period was 2023.      Medications:   LEVEMIR FLEXPEN 100 UNIT/ML Subcutaneous Solution Pen-injector       insulin aspart 100 Units/mL Subcutaneous Solution Pen-injector Inject 56 Units into the skin daily with breakfast AND 56 Units daily with lunch AND 56 Units daily with dinner. 15 mL 3    Insulin Glargine-yfgn (SEMGLEE, YFGN,) 100 UNIT/ML Subcutaneous Solution Pen-injector Inject 44 Units into the skin every morning AND 72 Units at bedtime. Use a new needle with each injection. Split nightly dose into 2 separate injections of 36 units each at 2 separate sites.. 15 mL 3    doxylamine-pyridoxine 10-10 MG Oral Tab EC Take 2 tablets by mouth nightly. Can take an  additional tablet in the morning and another at noon 240 tablet 0    Microlet Lancets Does not apply Misc Check blood sugar 4 times daily 400 each 3    docusate sodium (COLACE) 100 MG Oral Cap Take 1 capsule every day by oral route as needed for 30 days.      Polyethylene Glycol 3350 (MIRALAX OR) as needed.      Prenatal MV-Min-Fe Fum-FA-DHA (PRENATAL 1 OR) Take by mouth.      Insulin Pen Needle 32G X 4 MM Does not apply Misc May substitute if not on insurance formulary 100 each 5    labetalol 200 MG Oral Tab Take 1 tablet (200 mg total) by mouth 2 (two) times daily.      ondansetron (ZOFRAN) 4 mg tablet Take 1 tablet (4 mg total) by mouth every 8 (eight) hours as needed for Nausea. 30 tablet 3    Scopolamine 1.5mg TD patch 1mg/3days Place 1 patch onto the skin every third day. 10 patch 3    aspirin (ASPIRIN 81) 81 MG Oral Tab EC Take 1 tablet (81 mg total) by mouth daily. 90 tablet 3    CONTOUR NEXT TEST In Vitro Strip 1 each by Other route 4 (four) times daily. 400 each 3    levothyroxine 100 MCG Oral Tab Take 1 tablet (100 mcg total) by mouth daily.          Allergies:  No Known Allergies    Past Medical History:  Past Medical History:   Diagnosis Date    Diabetes (HCC)     Essential hypertension 2022    Inflammatory arthritis     Thyroid disease        Past Surgical History:  Past Surgical History:   Procedure Laterality Date    APPENDECTOMY      HC  SECTION LEVEL I         Immunizations:   Immunization History   Administered Date(s) Administered    >=3 YRS TRI  MULTIDOSE VIAL (41490) FLU CLINIC 2013    Covid-19 Vaccine Moderna 100 mcg/0.5 ml 2021    Covid-19 Vaccine Pfizer 30 mcg/0.3 ml 2021, 10/11/2021    FLUZONE 6 months and older PFS 0.5 ml (26730) 10/21/2021, 2022, 2023    HEP B, Adult 2015, 2015, 2015    Hep B, Unspecified Formulation 2015, 2015, 2015, 2021    MMR 2015, 2021    RHO(D) Immune Globulin  06/23/2015, 07/23/2015, 12/24/2015    TDAP 06/11/2015, 12/05/2023           Objective:     Objective:       Vitals:    02/05/24 1315   BP: 122/72   Pulse: 82   Weight: 213 lb 6 oz (96.8 kg)         Body mass index is 39.03 kg/m².    General: AAO.NAD.   CVS exam: normal peripheral perfusion  Chest: non-labored breathing, no tachypnea   Abdominal exam: gravid   Pelvic exam: deferred    Labs:  Prenatal Results       Initial Prenatal Labs EDWARD (GA 0-24w)       Test Value Reference Range Date Time    ABO Group  A   08/24/23 1324    RH Type  Positive   08/24/23 1324    Antibody Screen OB  Negative   08/24/23 1324    Rubella Titer OB  Positive  Positive 10/16/23 1136    Hep B Surf Ag OB  Nonreactive  Nonreactive  08/24/23 1324    RPR Serology        TREP  Nonreactive  Nonreactive  08/24/23 1324    HIV Combo Result OB  Non-Reactive  Non-Reactive 08/24/23 1324    HGB  11.7 g/dL 12.0 - 16.0 08/24/23 1324       12.6 g/dL 12.0 - 16.0 07/04/23 1432    HCT  34.2 % 35.0 - 48.0 08/24/23 1324       38.4 % 35.0 - 48.0 07/04/23 1432    MCV  87.2 fL 80.0 - 100.0 08/24/23 1324       87.9 fL 80.0 - 100.0 07/04/23 1432    Platelets  356.0 10(3)uL 150.0 - 450.0 08/24/23 1324       357.0 10(3)uL 150.0 - 450.0 07/04/23 1432    Urine Culture  No Growth at 18-24 hrs.   08/24/23 1331    HCV  Nonreactive  Nonreactive  08/24/23 1324              Additional Prenatal Labs (GA 0-24w)       Test Value Reference Range Date Time    Chlamydia with Pap  Negative  Negative 08/24/23 1331    GC with Pap  Negative  Negative 08/24/23 1331    Chlamydia        GC         Pap Smear  Cytology Results: Negative for intraepithelial lesion or malignancy.   08/24/23 1331    HPV        Sickel Cell Solubility HGB                  2nd Trimester Labs (GA 24-41w)       Test Value Reference Range Date Time    Antibody Screen OB        Serology RPR        HGB        HCT        Glucose 1 hour  211 mg/dL See comment 08/07/23 1149    Glucose Pedro Luis 3 hr Gestational Fasting         1 Hour glucose        2 Hour glucose        3 hour glucose        Ferritin                  3rd Trimester (28-41w)       Test Value Reference Range Date Time    Blood Type        Antibody screen        Group B Strep OB  Negative  Negative 24 1435    HGB  10.3 g/dL 12.0 - 16.0 23 1011    HCT  31.5 % 35.0 - 48.0 23 1011    HIV Combo Result OB  Non-Reactive  Non-Reactive 23 1011    Rapid HIV        Ferritin                  First Trimester & Genetic Testing (GA 0-40w)       Test Value Reference Range Date Time    MaternaT-21 (T13)        MaternaT-21 (T18)        MaternaT-21 T(T21)        VISIBILI T (T21)        VISIBILI T (T18)        QNatal T13        QNatal T18        QNatal T21        Cystic Fibrosis Screen [32]        Cystic Fibrosis Screen [165]        Cystic Fibrosis Screen [165]        Cystic Fibrosis Screen [165]        Cystic Fibrosis Screen [165]        RH d ([employer])        CVS        Nuchal Screening        NIPT [T13]        NIPT [T18]        NIPT [T21]        Carrier Screen        First Trimester Screen                  Genetic Screening (GA 0-45w)       Test Value Reference Range Date Time    AFP Tetra-Patient's HCG        AFP Tetra-Mom for HCG        AFP Tetra-Patient's UE3        AFP Tetra-Mom for UE3        AFP Tetra-Patient's ALVARADO        AFP Tetra-Mom for ALVARADO        AFP Tetra-Patient's AFP        AFP Tetra-Mom for AFP        AFP. Spina Bifida        Quad Screen ([facility])        AFP        SMA                  Legend    ^: Historical                                Assessment:     Jenny Clark is a 38 year old  at 37w0d who presents for routine OB visit. Overall doing well.     Problem List Items Addressed This Visit    None  Visit Diagnoses       37 weeks gestation of pregnancy    -  Primary    Relevant Orders    FETAL NON-STRESS TEST [facility] ONLY 30823 (Completed)                Plan:     Patient Active Problem List    Diagnosis    Supervision of high risk pregnancy in  third trimester    Request for sterilization     [ X] Medicaid consent form signed 12/5/23 1/8/24 undecided      Pre-existing type 2 diabetes mellitus during pregnancy, antepartum     [ x ] Check early HgA1c- 6.8 and glucola- 211--> referral to diabetic educator.      Low-lying placenta- RESOLVED    H/O CS x2     RCS 38-39 wks 2/15/24.  Currently not scheduled for BS.  Undecided.  Plan two MD's- 2/15/24 8:30 am      Antepartum multigravida of advanced maternal age     CVS Nl @ [facility]      Medication exposure during first trimester of pregnancy     Leflunomide Class X- early MFM consult  Rheumatologist Dr. Frances Dunn- cholestyramine given 6 weeks to reduce absorption?.       Spondylarthritis    Pre-existing essential hypertension during pregnancy     [ x ] Check baseline CMP, urine protein  Labetalol 200 BID   Home blood pressure monitoring  Level 2 ultrasound at 20 weeks- Nl  Fetal echo at 24 weeks  Growth ultrasound q4 weeks in 3rd trimester with BPP at or beyond 32 weeks- 84% EFW, >99% AC 36 wks  Weekly NST's at 32 weeks.  Delivery at 38 w for chronic hypertension and likely type 2 diabetes  Low-dose aspirin 81 to 120 mg daily until 37 weeks.  This could be started after the CVS.        Rheumatoid factor positive    Morning sickness     Taking diclegis, zofran, scopalamine patch      Inflammatory polyarthropathy (HCC)    Iron deficiency anemia    Type II diabetes mellitus, well controlled (HCC)    Osteoarthrosis    Abnormal antibody titer     Unclear history.  Patient now aware of any antibody issues.  [x  ] Check Ab screen with initial prenatal labs- Nl      Hyperproteinemia    Hypothyroidism     TSH 4.73 on 7/19/23.  Synthroid dosage increased to 100 mcg  [ x ] rechecked with prenatal labs  0.487 on 8/24/23, 1.6 on 10/16/23. 1.98 on 12/5/23      Hirsutism    Obesity affecting pregnancy, antepartum         Doing well, +FM  Denies LOF/VB/uctx  Mode of delivery:  RCS  GBS neg  Fetal movement count  given  Labor precautions provided       All of the findings and plan were discussed with the patient.  She notes understanding and agrees with the plan of care.  All questions were answered to the best of my ability at this time.      Total patient time was 15 minutes in evaluation, consultation, and coordination of care. This included face to face and non-face to face actions. The patient's questions and concerns were addressed.       Antoni Chua MD  [facility] - OBGYN        Note to patient and family   The 21st Century Cures Act makes medical notes available to patients in the interest of transparency.  However, please be advised that this is a medical document.  It is intended as [id number] communication.  It is written and medical language may contain abbreviations or verbiage that are technical and unfamiliar.  It may appear blunt or direct.  Medical documents are intended to carry relevant information, facts as evident, and the clinical opinion of the practitioner.      This note could include assistance by Dragon voice recognition. Errors in content may be related to improper recognition by the system; efforts to review and correct have been done but errors may still exist.

## 2024-02-05 NOTE — PATIENT INSTRUCTIONS
KICK COUNT INSTRUCTIONS    After 28 weeks of pregnancy, we would like for you to monitor your baby’s movement daily by doing kick counts, an easy way for you to assess your baby’s well-being.    How to count kicks:  Choose a time when the baby is active, such as after a meal.  Sit comfortably or lie on your side, and count the number of movements in an hour… you should feel 10 movements in 2 hours    The evening hours seem to be the most convenient time to do this test, although you can also do this test anytime you are concerned about the baby’s movement.    Call the office right away at [phone] if you notice any of the following:  Your baby moves less than 10 times in 2 hours while you are doing kick counts.  Your baby moves much less often than on the days before.  You have not felt your baby move all day. Labor Instructions    How do I know if it’s true labor?  One of the most important aspects of any pregnancy is being able to recognize the onset of labor.  Unfortunately, on occasion it can be difficult or confusing, especially if you have had one or more children.  Each labor can begin in a different way even if you have had four or five children.    If this is your first child, it is very common to have labor for an average greater than 10 hours; however, there have been rare instances for labor to be two hours or less for a first time mother. It is more important for you to know if this is your second or third baby to realize that any labor after the first is usually shorter.  There is no way to tell how long or short the labor will be. Therefore, please call us if you are unsure labor has started.       Usually, during the last six weeks of pregnancy, [place] contractions or “false labor pains occur”.  False labor is generally not very painful though it is not always easy to tell.  You may feel contractions, cramps or uterine tightening somewhere between every 3-30 minutes but they will not  continue to get stronger over time.  If you lie down, drink plenty of fluid or walk around, the contractions may go away.   False contractions are very common if you have been active on your feet for several hours.   Women frequently worry about being sent home from the hospital without having their baby (i.e. the labor stopped).  Actually, this is an unnecessary worry, for this is an infrequent occurrence.     True labor usually begins in one of two ways.  In most patients it begins with contractions of the uterus, which are irregular (but not always) in the beginning.  They are cramp-like in character and feel similar to menstrual cramps.  After a while, they become more regular, and they seem to last a little longer, and feel a little sharper.  These symptoms are very important-more important- than the timing of the contractions.  Having regular (usually closer), longer lasting (35-70 seconds), and sharper (more painful) contractions are the common symptoms of actual labor.  The second way in which labor can begin which occurs in approximately 30% of all patients is the rupture of the bag of water.  This is a sudden gush of watery fluid, usually sufficient to run down your leg and onto the floor, or you may wet a large area of the bed if it happens at night.  There may also be tricking that is uncontrolled.  If you are unsure, please call the office.      When should I head to the hospital?  When contractions are strong and every 5 minutes for one hour.  If you have a gush of water or you think you might be leaking fluid.  If you are bleeding heavily.  If your baby is not moving around at least every 1-2 hours.  If you are worried about something.  When you think you are ready to go to the hospital.    Who do I call with concerns?  Call the office at [phone].  If the office is closed, the answering service will send a message to the physician on call.  The on call physician will be available for emergency  phone calls only.          Can I eat in labor?  It is good to eat a light meal at home before going to the hospital.  Eat foods like crackers, popsicles, soup and fruit.  Avoid foods that are difficult to digest like meat, a lot of dairy products and high fat foods.  DO NOT EAT if you know you are scheduled for a  ().      What will happen when I get to the hospital?  You are going to [facility] in [facility].  After 8 pm, you will need to go through the Emergency Department.  When you arrive at the hospital, you will be admitted and examined.   There are a few factors that will determine if you will be allowed to be up out of bed or if you would need to stay in bed.  The main factors are how well the baby is entering into the pelvis and if the bag of lagunas is in intact or ruptured.  An intravenous (IV) solution will, with exceptions, be started.  This is extremely important especially for the baby.   Your  will be allowed in the room during your labor. During the delivery, the nurses will inform you of the hospital policy and how many coaches are allowed.  You may desire pain medication or anesthesia for pain.  You probably discussed some aspects of pain medication with us during your prenatal care.  The various options may also have been discussed in Prenatal Classes.  We utilize IV narcotics and epidural anesthesia when our patients request to have them.  If you chose to have no anesthesia, none will be administered.  A local anesthetic may be used at the time of delivery      What should I to bring to the hospital?  Maternity clothes to go home in  You can bring your own night gown to wear after giving birth, but most women prefer to wear the hospital gown because it may get soiled  Nursing Bra if you are planning to breastfeed  Clothes for your baby to go home in  Baby Car Seat.  Be sure you know how to install it correctly. Please install it before going to the  hospital  Routine toiletries like toothbrush, shampoo, hairbrush and etc.   You can bring your favorite pillow, but please put a colored pillow case on it so it doesn’t get mixed up with hospital pillows    How long will I stay in the hospital?  The date you leave the hospital may vary depending on the speed of your recovery.  If you have a vaginal delivery, you will stay in the hospital 24-48 hours after your delivery as long as you aren’t having any complications.    If you have had a , you will stay in the hospital 48-72 hours as long as you aren’t having any complications.       Going Home Instructions  There are no set rules as to what you may do each day or week after you are home.  You will receive discharge instructions to help you each day.  Remember, early ambulation in the hospital is to prevent complications.  Do not let this lull you into a false sense of strength or ability to do certain physical acts which may tire you excessively.  Please call the office within a few days after you are discharged from the hospital to schedule your post-partum visit, which is usually 4-6 weeks after delivery.    Any medications necessary will be discussed on an individual basis.  If you decide to breastfeed your baby, you should continue your prenatal vitamins.  If you do not breastfeed, simply finish the prenatal vitamins you have.      The staff at [facility] wish you a joyous and exciting birth.  If there is anything we can do to make this a better experience for you please do not hesitate to ask.

## 2024-02-06 ENCOUNTER — TELEPHONE (OUTPATIENT)
Dept: OBGYN UNIT | Facility: HOSPITAL | Age: 39
End: 2024-02-06

## 2024-02-06 RX ORDER — FERROUS SULFATE 324(65)MG
TABLET, DELAYED RELEASE (ENTERIC COATED) ORAL
COMMUNITY

## 2024-02-06 NOTE — PROGRESS NOTES
Pt given arrival instructions. RN reviewed  procedure, general plan of care, and  pain medication. Questions answered. Pt verbalizes understanding.

## 2024-02-08 ENCOUNTER — APPOINTMENT (OUTPATIENT)
Dept: OBGYN CLINIC | Facility: CLINIC | Age: 39
End: 2024-02-08
Payer: MEDICAID

## 2024-02-08 ENCOUNTER — NURSE ONLY (OUTPATIENT)
Dept: OBGYN CLINIC | Facility: CLINIC | Age: 39
End: 2024-02-08
Payer: MEDICAID

## 2024-02-08 VITALS
SYSTOLIC BLOOD PRESSURE: 128 MMHG | HEART RATE: 104 BPM | DIASTOLIC BLOOD PRESSURE: 84 MMHG | HEIGHT: 62 IN | WEIGHT: 209.25 LBS | BODY MASS INDEX: 38.5 KG/M2

## 2024-02-08 VITALS
HEART RATE: 104 BPM | WEIGHT: 209.25 LBS | BODY MASS INDEX: 38.5 KG/M2 | SYSTOLIC BLOOD PRESSURE: 128 MMHG | HEIGHT: 62 IN | DIASTOLIC BLOOD PRESSURE: 84 MMHG

## 2024-02-08 DIAGNOSIS — O09.529 ANTEPARTUM MULTIGRAVIDA OF ADVANCED MATERNAL AGE: ICD-10-CM

## 2024-02-08 DIAGNOSIS — O10.013 PRE-EXISTING ESSENTIAL HYPERTENSION DURING PREGNANCY IN THIRD TRIMESTER: Primary | ICD-10-CM

## 2024-02-08 DIAGNOSIS — O24.119 PRE-EXISTING TYPE 2 DIABETES MELLITUS DURING PREGNANCY, ANTEPARTUM: ICD-10-CM

## 2024-02-08 PROCEDURE — 59025 FETAL NON-STRESS TEST: CPT | Performed by: NURSE PRACTITIONER

## 2024-02-13 ENCOUNTER — ROUTINE PRENATAL (OUTPATIENT)
Dept: OBGYN CLINIC | Facility: CLINIC | Age: 39
End: 2024-02-13
Payer: MEDICAID

## 2024-02-13 ENCOUNTER — APPOINTMENT (OUTPATIENT)
Dept: OBGYN CLINIC | Facility: CLINIC | Age: 39
End: 2024-02-13
Payer: MEDICAID

## 2024-02-13 VITALS
BODY MASS INDEX: 38 KG/M2 | SYSTOLIC BLOOD PRESSURE: 120 MMHG | HEART RATE: 98 BPM | WEIGHT: 208.25 LBS | DIASTOLIC BLOOD PRESSURE: 80 MMHG

## 2024-02-13 DIAGNOSIS — Z3A.38 38 WEEKS GESTATION OF PREGNANCY: Primary | ICD-10-CM

## 2024-02-13 RX ORDER — BREAST PUMP
EACH MISCELLANEOUS
Qty: 1 EACH | Refills: 0 | Status: SHIPPED | OUTPATIENT
Start: 2024-02-13

## 2024-02-13 NOTE — PROGRESS NOTES
[facility]  Obstetrics and Gynecology  Routine OB visit Progress Note    Subjective:     Jenny Clark is a 38 year old  at 38w1d who presents for routine OB visit.  Patient reports doing well.   Denies contractions, vaginal bleeding or leakage of fluid.   Good fetal movement.    Review of Systems:  General: no complaints per category. See HPI for additional information.   Breast: no complaints per category. See HPI for additional information.   Respiratory: no complaints per category. See HPI for additional information.   Cardiovascular: no complaints per category. See HPI for additional information.   GI: no complaints per category. See HPI for additional information.   : no complaints per category. See HPI for additional information.   Heme: no complaints per category. See HPI for additional information.     History/Other:     Obstetrical History:   OB History    Para Term  AB Living   3 2 2     2   SAB IAB Ectopic Multiple Live Births           2      # Outcome Date GA Lbr Joni/2nd Weight Sex Delivery Anes PTL Lv   3 Current            2 Term 06   10 lb 10 oz (4.819 kg) F CS-Unspec EPI  THERESA   1 Term 03   9 lb 5 oz (4.224 kg) F CS-Unspec EPI N THERESA         Gynecological History:     Patient's last menstrual period was 2023.      Medications:   Misc. Devices (BREAST PUMP) Does not apply Misc Any pump covered by insurance 1 each 0    Ferrous Sulfate 324 (65 Fe) MG Oral Tab EC Take by mouth.      LEVEMIR FLEXPEN 100 UNIT/ML Subcutaneous Solution Pen-injector       insulin aspart 100 Units/mL Subcutaneous Solution Pen-injector Inject 56 Units into the skin daily with breakfast AND 56 Units daily with lunch AND 56 Units daily with dinner. 15 mL 3    Insulin Glargine-yfgn (SEMGLEE, YFGN,) 100 UNIT/ML Subcutaneous Solution Pen-injector Inject 44 Units into the skin every morning AND 72 Units at bedtime. Use a new needle with each injection. Split nightly dose into 2  separate injections of 36 units each at 2 separate sites.. 15 mL 3    doxylamine-pyridoxine 10-10 MG Oral Tab EC Take 2 tablets by mouth nightly. Can take an additional tablet in the morning and another at noon 240 tablet 0    Microlet Lancets Does not apply Misc Check blood sugar 4 times daily 400 each 3    docusate sodium (COLACE) 100 MG Oral Cap Take 1 capsule every day by oral route as needed for 30 days.      Polyethylene Glycol 3350 (MIRALAX OR) as needed.      Prenatal MV-Min-Fe Fum-FA-DHA (PRENATAL 1 OR) Take by mouth.      Insulin Pen Needle 32G X 4 MM Does not apply Misc May substitute if not on insurance formulary 100 each 5    labetalol 200 MG Oral Tab Take 1 tablet (200 mg total) by mouth 2 (two) times daily.      ondansetron (ZOFRAN) 4 mg tablet Take 1 tablet (4 mg total) by mouth every 8 (eight) hours as needed for Nausea. 30 tablet 3    Scopolamine 1.5mg TD patch 1mg/3days Place 1 patch onto the skin every third day. 10 patch 3    aspirin (ASPIRIN 81) 81 MG Oral Tab EC Take 1 tablet (81 mg total) by mouth daily. 90 tablet 3    CONTOUR NEXT TEST In Vitro Strip 1 each by Other route 4 (four) times daily. 400 each 3    levothyroxine 100 MCG Oral Tab Take 1 tablet (100 mcg total) by mouth daily.          Allergies:  No Known Allergies    Past Medical History:  Past Medical History:   Diagnosis Date    Anemia     Diabetes (HCC)     Essential hypertension 2022    Hypothyroidism     Inflammatory arthritis     Spondyloarthritis     Thyroid disease        Past Surgical History:  Past Surgical History:   Procedure Laterality Date    APPENDECTOMY            HC  SECTION LEVEL I         Immunizations:   Immunization History   Administered Date(s) Administered    >=3 YRS TRI  MULTIDOSE VIAL (22795) [facility] 2013    Covid-19 Vaccine Moderna 100 mcg/0.5 ml 2021    Covid-19 Vaccine Pfizer 30 mcg/0.3 ml 2021, 10/11/2021    FLUZONE 6 months and older PFS 0.5 ml (71211)  10/21/2021, 09/14/2022, 11/13/2023    HEP B, Adult 06/23/2015, 07/23/2015, 12/24/2015    Hep B, Unspecified Formulation 06/23/2015, 07/23/2015, 12/24/2015, 04/23/2021    MMR 06/23/2015, 04/23/2021    RHO(D) Immune Globulin 06/23/2015, 07/23/2015, 12/24/2015    TDAP 06/11/2015, 12/05/2023           Objective:     Objective:       Vitals:    02/13/24 1416   BP: 120/80   Pulse: 98   Weight: 208 lb 4 oz (94.5 kg)         Body mass index is 38.09 kg/m².    General: AAO.NAD.   CVS exam: normal peripheral perfusion  Chest: non-labored breathing, no tachypnea   Abdominal exam: gravid   Pelvic exam: deferred    Labs:  Prenatal Results       Initial Prenatal Labs EDWARD (GA 0-24w)       Test Value Reference Range Date Time    ABO Group  A   08/24/23 1324    RH Type  Positive   08/24/23 1324    Antibody Screen OB  Negative   08/24/23 1324    Rubella Titer OB  Positive  Positive 10/16/23 1136    Hep B Surf Ag OB  Nonreactive  Nonreactive  08/24/23 1324    RPR Serology        TREP  Nonreactive  Nonreactive  08/24/23 1324    HIV Combo Result OB  Non-Reactive  Non-Reactive 08/24/23 1324    HGB  11.7 g/dL 12.0 - 16.0 08/24/23 1324       12.6 g/dL 12.0 - 16.0 07/04/23 1432    HCT  34.2 % 35.0 - 48.0 08/24/23 1324       38.4 % 35.0 - 48.0 07/04/23 1432    MCV  87.2 fL 80.0 - 100.0 08/24/23 1324       87.9 fL 80.0 - 100.0 07/04/23 1432    Platelets  356.0 10(3)uL 150.0 - 450.0 08/24/23 1324       357.0 10(3)uL 150.0 - 450.0 07/04/23 1432    Urine Culture  No Growth at 18-24 hrs.   08/24/23 1331    HCV  Nonreactive  Nonreactive  08/24/23 1324              Additional Prenatal Labs (GA 0-24w)       Test Value Reference Range Date Time    Chlamydia with Pap  Negative  Negative 08/24/23 1331    GC with Pap  Negative  Negative 08/24/23 1331    Chlamydia        GC         Pap Smear  Cytology Results: Negative for intraepithelial lesion or malignancy.   08/24/23 1331    HPV        Sickel Cell Solubility HGB                  2nd Trimester Labs  (GA 24-41w)       Test Value Reference Range Date Time    Antibody Screen OB        Serology RPR        HGB        HCT        Glucose 1 hour  211 mg/dL See comment 23 1149    Glucose Pedro Luis 3 hr Gestational Fasting        1 Hour glucose        2 Hour glucose        3 hour glucose        Ferritin                  3rd Trimester (28-41w)       Test Value Reference Range Date Time    Blood Type        Antibody screen        Group B Strep OB  Negative  Negative 24 1435    HGB  10.3 g/dL 12.0 - 16.0 23 1011    HCT  31.5 % 35.0 - 48.0 23 1011    HIV Combo Result OB  Non-Reactive  Non-Reactive 23 1011    Rapid HIV        Ferritin                  First Trimester & Genetic Testing (GA 0-40w)       Test Value Reference Range Date Time    MaternaT-21 (T13)        MaternaT-21 (T18)        MaternaT-21 T(T21)        VISIBILI T (T21)        VISIBILI T (T18)        QNatal T13        QNatal T18        QNatal T21        Cystic Fibrosis Screen [32]        Cystic Fibrosis Screen [165]        Cystic Fibrosis Screen [165]        Cystic Fibrosis Screen [165]        Cystic Fibrosis Screen [165]        RH d ([facility])        CVS        Nuchal Screening        NIPT [T13]        NIPT [T18]        NIPT [T21]        Carrier Screen        First Trimester Screen                  Genetic Screening (GA 0-45w)       Test Value Reference Range Date Time    AFP Tetra-Patient's HCG        AFP Tetra-Mom for HCG        AFP Tetra-Patient's UE3        AFP Tetra-Mom for UE3        AFP Tetra-Patient's ALVARADO        AFP Tetra-Mom for ALVARADO        AFP Tetra-Patient's AFP        AFP Tetra-Mom for AFP        AFP. Spina Bifida        Quad Screen ([facility])        AFP        SMA                  Legend    ^: Historical                                Assessment:     Jenny Clark is a 38 year old  at 38w1d who presents for routine OB visit. Overall doing well.     Problem List Items Addressed This Visit    None  Visit Diagnoses       38  weeks gestation of pregnancy    -  Primary    Relevant Orders    FETAL NON-STRESS TEST [facility] ONLY 87035 (Completed)                Plan:     Patient Active Problem List    Diagnosis    Supervision of high risk pregnancy in third trimester    Request for sterilization     [ X] Medicaid consent form signed 12/5/23 1/8/24 undecided      Pre-existing type 2 diabetes mellitus during pregnancy, antepartum     [ x ] Check early HgA1c- 6.8 and glucola- 211--> referral to diabetic educator.      Low-lying placenta- RESOLVED    H/O CS x2     RCS 38-39 wks 2/15/24.  Currently not scheduled for BS.  Undecided.  Plan two MD's- 2/15/24 8:30 am      Antepartum multigravida of advanced maternal age     CVS Nl @ [facility]      Medication exposure during first trimester of pregnancy     Leflunomide Class X- early MFM consult  Rheumatologist Dr. Frances Dunn- cholestyramine given 6 weeks to reduce absorption?.       Spondylarthritis    Pre-existing essential hypertension during pregnancy     [ x ] Check baseline CMP, urine protein  Labetalol 200 BID   Home blood pressure monitoring  Level 2 ultrasound at 20 weeks- Nl  Fetal echo at 24 weeks  Growth ultrasound q4 weeks in 3rd trimester with BPP at or beyond 32 weeks- 84% EFW, >99% AC 36 wks  Weekly NST's at 32 weeks.  Delivery at 38 w for chronic hypertension and likely type 2 diabetes  Low-dose aspirin 81 to 120 mg daily until 37 weeks.  This could be started after the CVS.        Rheumatoid factor positive    Morning sickness     Taking diclegis, zofran, scopalamine patch      Inflammatory polyarthropathy (HCC)    Iron deficiency anemia    Type II diabetes mellitus, well controlled (HCC)    Osteoarthrosis    Abnormal antibody titer     Unclear history.  Patient now aware of any antibody issues.  [x  ] Check Ab screen with initial prenatal labs- Nl      Hyperproteinemia    Hypothyroidism     TSH 4.73 on 7/19/23.  Synthroid dosage increased to 100 mcg  [ x ] rechecked with  prenatal labs  0.487 on 8/24/23, 1.6 on 10/16/23. 1.98 on 12/5/23      Hirsutism    Obesity affecting pregnancy, antepartum         Doing well, +FM  Denies LOF/VB/uctx  Mode of delivery: RCS  SVE deferred   GBS neg  Fetal movement count given  Labor precautions provided         All of the findings and plan were discussed with the patient.  She notes understanding and agrees with the plan of care.  All questions were answered to the best of my ability at this time.      Total patient time was 15 minutes in evaluation, consultation, and coordination of care. This included face to face and non-face to face actions. The patient's questions and concerns were addressed.       Antoni Chua MD   [facility] - OBGYN        Note to patient and family   The 21st Century Cures Act makes medical notes available to patients in the interest of transparency.  However, please be advised that this is a medical document.  It is intended as quav-va-kaey communication.  It is written and medical language may contain abbreviations or verbiage that are technical and unfamiliar.  It may appear blunt or direct.  Medical documents are intended to carry relevant information, facts as evident, and the clinical opinion of the practitioner.      This note could include assistance by Dragon voice recognition. Errors in content may be related to improper recognition by the system; efforts to review and correct have been done but errors may still exist.

## 2024-02-13 NOTE — PATIENT INSTRUCTIONS
KICK COUNT INSTRUCTIONS    After 28 weeks of pregnancy, we would like for you to monitor your baby’s movement daily by doing kick counts, an easy way for you to assess your baby’s well-being.    How to count kicks:  Choose a time when the baby is active, such as after a meal.  Sit comfortably or lie on your side, and count the number of movements in an hour… you should feel 10 movements in 2 hours    The evening hours seem to be the most convenient time to do this test, although you can also do this test anytime you are concerned about the baby’s movement.    Call the office right away at [phone] if you notice any of the following:  Your baby moves less than 10 times in 2 hours while you are doing kick counts.  Your baby moves much less often than on the days before.  You have not felt your baby move all day. Labor Instructions    How do I know if it’s true labor?  One of the most important aspects of any pregnancy is being able to recognize the onset of labor.  Unfortunately, on occasion it can be difficult or confusing, especially if you have had one or more children.  Each labor can begin in a different way even if you have had four or five children.    If this is your first child, it is very common to have labor for an average greater than 10 hours; however, there have been rare instances for labor to be two hours or less for a first time mother. It is more important for you to know if this is your second or third baby to realize that any labor after the first is usually shorter.  There is no way to tell how long or short the labor will be. Therefore, please call us if you are unsure labor has started.       Usually, during the last six weeks of pregnancy, [place]-Jones contractions or “false labor pains occur”.  False labor is generally not very painful though it is not always easy to tell.  You may feel contractions, cramps or uterine tightening somewhere between every 3-30 minutes but they will not  continue to get stronger over time.  If you lie down, drink plenty of fluid or walk around, the contractions may go away.   False contractions are very common if you have been active on your feet for several hours.   Women frequently worry about being sent home from the hospital without having their baby (i.e. the labor stopped).  Actually, this is an unnecessary worry, for this is an infrequent occurrence.     True labor usually begins in one of two ways.  In most patients it begins with contractions of the uterus, which are irregular (but not always) in the beginning.  They are cramp-like in character and feel similar to menstrual cramps.  After a while, they become more regular, and they seem to last a little longer, and feel a little sharper.  These symptoms are very important-more important- than the timing of the contractions.  Having regular (usually closer), longer lasting (35-70 seconds), and sharper (more painful) contractions are the common symptoms of actual labor.  The second way in which labor can begin which occurs in approximately 30% of all patients is the rupture of the bag of water.  This is a sudden gush of watery fluid, usually sufficient to run down your leg and onto the floor, or you may wet a large area of the bed if it happens at night.  There may also be tricking that is uncontrolled.  If you are unsure, please call the office.      When should I head to the hospital?  When contractions are strong and every 5 minutes for one hour.  If you have a gush of water or you think you might be leaking fluid.  If you are bleeding heavily.  If your baby is not moving around at least every 1-2 hours.  If you are worried about something.  When you think you are ready to go to the hospital.    Who do I call with concerns?  Call the office at [phone].  If the office is closed, the answering service will send a message to the physician on call.  The on call physician will be available for emergency  phone calls only.          Can I eat in labor?  It is good to eat a light meal at home before going to the hospital.  Eat foods like crackers, popsicles, soup and fruit.  Avoid foods that are difficult to digest like meat, a lot of dairy products and high fat foods.  DO NOT EAT if you know you are scheduled for a  ().      What will happen when I get to the hospital?  You are going to [facility].  After 8 pm, you will need to go through the Emergency Department.  When you arrive at the hospital, you will be admitted and examined.   There are a few factors that will determine if you will be allowed to be up out of bed or if you would need to stay in bed.  The main factors are how well the baby is entering into the pelvis and if the bag of lagunas is in intact or ruptured.  An intravenous (IV) solution will, with exceptions, be started.  This is extremely important especially for the baby.   Your  will be allowed in the room during your labor. During the delivery, the nurses will inform you of the hospital policy and how many coaches are allowed.  You may desire pain medication or anesthesia for pain.  You probably discussed some aspects of pain medication with us during your prenatal care.  The various options may also have been discussed in Prenatal Classes.  We utilize IV narcotics and epidural anesthesia when our patients request to have them.  If you chose to have no anesthesia, none will be administered.  A local anesthetic may be used at the time of delivery      What should I to bring to the hospital?  Maternity clothes to go home in  You can bring your own night gown to wear after giving birth, but most women prefer to wear the hospital gown because it may get soiled  Nursing Bra if you are planning to breastfeed  Clothes for your baby to go home in  Baby Car Seat.  Be sure you know how to install it correctly. Please install it before going to the  hospital  Routine toiletries like toothbrush, shampoo, hairbrush and etc.   You can bring your favorite pillow, but please put a colored pillow case on it so it doesn’t get mixed up with hospital pillows    How long will I stay in the hospital?  The date you leave the hospital may vary depending on the speed of your recovery.  If you have a vaginal delivery, you will stay in the hospital 24-48 hours after your delivery as long as you aren’t having any complications.    If you have had a , you will stay in the hospital 48-72 hours as long as you aren’t having any complications.       Going Home Instructions  There are no set rules as to what you may do each day or week after you are home.  You will receive discharge instructions to help you each day.  Remember, early ambulation in the hospital is to prevent complications.  Do not let this lull you into a false sense of strength or ability to do certain physical acts which may tire you excessively.  Please call the office within a few days after you are discharged from the hospital to schedule your post-partum visit, which is usually 4-6 weeks after delivery.    Any medications necessary will be discussed on an individual basis.  If you decide to breastfeed your baby, you should continue your prenatal vitamins.  If you do not breastfeed, simply finish the prenatal vitamins you have.      The staff at [facility] wish you a joyous and exciting birth.  If there is anything we can do to make this a better experience for you please do not hesitate to ask.

## 2024-02-15 ENCOUNTER — ANESTHESIA EVENT (OUTPATIENT)
Dept: OBGYN UNIT | Facility: HOSPITAL | Age: 39
End: 2024-02-15
Payer: COMMERCIAL

## 2024-02-15 ENCOUNTER — HOSPITAL ENCOUNTER (INPATIENT)
Facility: HOSPITAL | Age: 39
LOS: 3 days | Discharge: HOME | End: 2024-02-18
Attending: OBSTETRICS & GYNECOLOGY | Admitting: OBSTETRICS & GYNECOLOGY
Payer: COMMERCIAL

## 2024-02-15 ENCOUNTER — ANESTHESIA (OUTPATIENT)
Dept: OBGYN UNIT | Facility: HOSPITAL | Age: 39
End: 2024-02-15
Payer: COMMERCIAL

## 2024-02-15 ENCOUNTER — TELEPHONE (OUTPATIENT)
Dept: OBGYN CLINIC | Facility: CLINIC | Age: 39
End: 2024-02-15

## 2024-02-15 PROBLEM — Z98.891 STATUS POST REPEAT LOW TRANSVERSE CESAREAN SECTION: Status: ACTIVE | Noted: 2024-02-15

## 2024-02-15 PROBLEM — Z34.90 PREGNANCY (HCC): Status: ACTIVE | Noted: 2024-02-15

## 2024-02-15 LAB
ALBUMIN SERPL-MCNC: 2.3 G/DL (ref 3.4–5)
ALBUMIN/GLOB SERPL: 0.6 {RATIO} (ref 1–2)
ALP LIVER SERPL-CCNC: 146 U/L
ALT SERPL-CCNC: 43 U/L
ANION GAP SERPL CALC-SCNC: 5 MMOL/L (ref 0–18)
ANION GAP SERPL CALC-SCNC: 8 MMOL/L (ref 0–18)
ANTIBODY SCREEN: NEGATIVE
AST SERPL-CCNC: 29 U/L (ref 15–37)
BASOPHILS # BLD AUTO: 0.01 X10(3) UL (ref 0–0.2)
BASOPHILS NFR BLD AUTO: 0.2 %
BILIRUB SERPL-MCNC: 0.3 MG/DL (ref 0.1–2)
BUN BLD-MCNC: 8 MG/DL (ref 9–23)
BUN BLD-MCNC: 9 MG/DL (ref 9–23)
CALCIUM BLD-MCNC: 8.7 MG/DL (ref 8.5–10.1)
CALCIUM BLD-MCNC: 8.9 MG/DL (ref 8.5–10.1)
CHLORIDE SERPL-SCNC: 109 MMOL/L (ref 98–112)
CHLORIDE SERPL-SCNC: 110 MMOL/L (ref 98–112)
CO2 SERPL-SCNC: 22 MMOL/L (ref 21–32)
CO2 SERPL-SCNC: 24 MMOL/L (ref 21–32)
CREAT BLD-MCNC: 0.69 MG/DL
CREAT BLD-MCNC: 0.8 MG/DL
EGFRCR SERPLBLD CKD-EPI 2021: 112 ML/MIN/1.73M2 (ref 60–?)
EGFRCR SERPLBLD CKD-EPI 2021: 95 ML/MIN/1.73M2 (ref 60–?)
EOSINOPHIL # BLD AUTO: 0.01 X10(3) UL (ref 0–0.7)
EOSINOPHIL NFR BLD AUTO: 0.2 %
ERYTHROCYTE [DISTWIDTH] IN BLOOD BY AUTOMATED COUNT: 19.5 %
GLOBULIN PLAS-MCNC: 3.9 G/DL (ref 2.8–4.4)
GLUCOSE BLD-MCNC: 115 MG/DL (ref 70–99)
GLUCOSE BLD-MCNC: 127 MG/DL (ref 70–99)
GLUCOSE BLD-MCNC: 134 MG/DL (ref 70–99)
GLUCOSE BLD-MCNC: 172 MG/DL (ref 70–99)
GLUCOSE BLD-MCNC: 194 MG/DL (ref 70–99)
GLUCOSE BLD-MCNC: 218 MG/DL (ref 70–99)
GLUCOSE BLD-MCNC: 265 MG/DL (ref 70–99)
HCT VFR BLD AUTO: 35.1 %
HGB BLD-MCNC: 11.5 G/DL
IMM GRANULOCYTES # BLD AUTO: 0.03 X10(3) UL (ref 0–1)
IMM GRANULOCYTES NFR BLD: 0.6 %
LYMPHOCYTES # BLD AUTO: 1.24 X10(3) UL (ref 1–4)
LYMPHOCYTES NFR BLD AUTO: 23.3 %
MCH RBC QN AUTO: 28.5 PG (ref 26–34)
MCHC RBC AUTO-ENTMCNC: 32.8 G/DL (ref 31–37)
MCV RBC AUTO: 86.9 FL
MONOCYTES # BLD AUTO: 0.44 X10(3) UL (ref 0.1–1)
MONOCYTES NFR BLD AUTO: 8.3 %
NEUTROPHILS # BLD AUTO: 3.59 X10 (3) UL (ref 1.5–7.7)
NEUTROPHILS # BLD AUTO: 3.59 X10(3) UL (ref 1.5–7.7)
NEUTROPHILS NFR BLD AUTO: 67.4 %
OSMOLALITY SERPL CALC.SUM OF ELEC: 288 MOSM/KG (ref 275–295)
OSMOLALITY SERPL CALC.SUM OF ELEC: 292 MOSM/KG (ref 275–295)
PLATELET # BLD AUTO: 272 10(3)UL (ref 150–450)
POTASSIUM SERPL-SCNC: 3.9 MMOL/L (ref 3.5–5.1)
POTASSIUM SERPL-SCNC: 4.2 MMOL/L (ref 3.5–5.1)
PROT SERPL-MCNC: 6.2 G/DL (ref 6.4–8.2)
RBC # BLD AUTO: 4.04 X10(6)UL
RH BLOOD TYPE: POSITIVE
SODIUM SERPL-SCNC: 139 MMOL/L (ref 136–145)
SODIUM SERPL-SCNC: 139 MMOL/L (ref 136–145)
T PALLIDUM AB SER QL IA: NONREACTIVE
WBC # BLD AUTO: 5.3 X10(3) UL (ref 4–11)

## 2024-02-15 PROCEDURE — 59515 CESAREAN DELIVERY: CPT | Performed by: OBSTETRICS & GYNECOLOGY

## 2024-02-15 RX ORDER — MORPHINE SULFATE 0.5 MG/ML
0.1 INJECTION, SOLUTION EPIDURAL; INTRATHECAL; INTRAVENOUS ONCE
Status: DISCONTINUED | OUTPATIENT
Start: 2024-02-15 | End: 2024-02-15

## 2024-02-15 RX ORDER — KETOROLAC TROMETHAMINE 30 MG/ML
30 INJECTION, SOLUTION INTRAMUSCULAR; INTRAVENOUS EVERY 6 HOURS
Status: DISPENSED | OUTPATIENT
Start: 2024-02-15 | End: 2024-02-16

## 2024-02-15 RX ORDER — SODIUM CHLORIDE, SODIUM LACTATE, POTASSIUM CHLORIDE, CALCIUM CHLORIDE 600; 310; 30; 20 MG/100ML; MG/100ML; MG/100ML; MG/100ML
INJECTION, SOLUTION INTRAVENOUS CONTINUOUS
Status: DISCONTINUED | OUTPATIENT
Start: 2024-02-15 | End: 2024-02-15

## 2024-02-15 RX ORDER — HYDROMORPHONE HYDROCHLORIDE 1 MG/ML
0.3 INJECTION, SOLUTION INTRAMUSCULAR; INTRAVENOUS; SUBCUTANEOUS EVERY 2 HOUR PRN
Status: DISCONTINUED | OUTPATIENT
Start: 2024-02-15 | End: 2024-02-18

## 2024-02-15 RX ORDER — KETOROLAC TROMETHAMINE 30 MG/ML
INJECTION, SOLUTION INTRAMUSCULAR; INTRAVENOUS
Status: COMPLETED
Start: 2024-02-15 | End: 2024-02-15

## 2024-02-15 RX ORDER — BISACODYL 10 MG
10 SUPPOSITORY, RECTAL RECTAL ONCE AS NEEDED
Status: DISCONTINUED | OUTPATIENT
Start: 2024-02-15 | End: 2024-02-18

## 2024-02-15 RX ORDER — NALOXONE HYDROCHLORIDE 0.4 MG/ML
0.08 INJECTION, SOLUTION INTRAMUSCULAR; INTRAVENOUS; SUBCUTANEOUS
Status: ACTIVE | OUTPATIENT
Start: 2024-02-15 | End: 2024-02-16

## 2024-02-15 RX ORDER — ONDANSETRON 2 MG/ML
4 INJECTION INTRAMUSCULAR; INTRAVENOUS EVERY 6 HOURS PRN
Status: DISCONTINUED | OUTPATIENT
Start: 2024-02-15 | End: 2024-02-15

## 2024-02-15 RX ORDER — DOCUSATE SODIUM 100 MG/1
100 CAPSULE, LIQUID FILLED ORAL
Status: DISCONTINUED | OUTPATIENT
Start: 2024-02-15 | End: 2024-02-18

## 2024-02-15 RX ORDER — SODIUM CHLORIDE, SODIUM LACTATE, POTASSIUM CHLORIDE, CALCIUM CHLORIDE 600; 310; 30; 20 MG/100ML; MG/100ML; MG/100ML; MG/100ML
125 INJECTION, SOLUTION INTRAVENOUS CONTINUOUS
Status: DISCONTINUED | OUTPATIENT
Start: 2024-02-15 | End: 2024-02-15

## 2024-02-15 RX ORDER — NICOTINE POLACRILEX 4 MG
15 LOZENGE BUCCAL
Status: DISCONTINUED | OUTPATIENT
Start: 2024-02-15 | End: 2024-02-18

## 2024-02-15 RX ORDER — ACETAMINOPHEN 500 MG
1000 TABLET ORAL ONCE
Status: COMPLETED | OUTPATIENT
Start: 2024-02-15 | End: 2024-02-15

## 2024-02-15 RX ORDER — CITRIC ACID/SODIUM CITRATE 334-500MG
30 SOLUTION, ORAL ORAL ONCE
Status: DISCONTINUED | OUTPATIENT
Start: 2024-02-15 | End: 2024-02-15

## 2024-02-15 RX ORDER — DIPHENHYDRAMINE HCL 25 MG
25 CAPSULE ORAL EVERY 4 HOURS PRN
Status: DISCONTINUED | OUTPATIENT
Start: 2024-02-15 | End: 2024-02-18

## 2024-02-15 RX ORDER — METOCLOPRAMIDE HYDROCHLORIDE 5 MG/ML
INJECTION INTRAMUSCULAR; INTRAVENOUS AS NEEDED
Status: DISCONTINUED | OUTPATIENT
Start: 2024-02-15 | End: 2024-02-15 | Stop reason: SURG

## 2024-02-15 RX ORDER — DEXTROSE, SODIUM CHLORIDE, SODIUM LACTATE, POTASSIUM CHLORIDE, AND CALCIUM CHLORIDE 5; .6; .31; .03; .02 G/100ML; G/100ML; G/100ML; G/100ML; G/100ML
INJECTION, SOLUTION INTRAVENOUS CONTINUOUS PRN
Status: DISCONTINUED | OUTPATIENT
Start: 2024-02-15 | End: 2024-02-18

## 2024-02-15 RX ORDER — ACETAMINOPHEN 500 MG
1000 TABLET ORAL EVERY 6 HOURS
Status: DISCONTINUED | OUTPATIENT
Start: 2024-02-15 | End: 2024-02-18

## 2024-02-15 RX ORDER — ONDANSETRON 2 MG/ML
4 INJECTION INTRAMUSCULAR; INTRAVENOUS EVERY 6 HOURS PRN
Status: DISCONTINUED | OUTPATIENT
Start: 2024-02-15 | End: 2024-02-18

## 2024-02-15 RX ORDER — IBUPROFEN 600 MG/1
600 TABLET ORAL EVERY 6 HOURS
Status: DISCONTINUED | OUTPATIENT
Start: 2024-02-16 | End: 2024-02-18

## 2024-02-15 RX ORDER — NALBUPHINE HYDROCHLORIDE 10 MG/ML
2.5 INJECTION, SOLUTION INTRAMUSCULAR; INTRAVENOUS; SUBCUTANEOUS
Status: DISCONTINUED | OUTPATIENT
Start: 2024-02-15 | End: 2024-02-15 | Stop reason: HOSPADM

## 2024-02-15 RX ORDER — NICOTINE POLACRILEX 4 MG
15 LOZENGE BUCCAL
Status: DISCONTINUED | OUTPATIENT
Start: 2024-02-15 | End: 2024-02-15 | Stop reason: HOSPADM

## 2024-02-15 RX ORDER — NALBUPHINE HYDROCHLORIDE 10 MG/ML
2.5 INJECTION, SOLUTION INTRAMUSCULAR; INTRAVENOUS; SUBCUTANEOUS EVERY 4 HOURS PRN
Status: DISCONTINUED | OUTPATIENT
Start: 2024-02-15 | End: 2024-02-18

## 2024-02-15 RX ORDER — PHENYLEPHRINE HCL 10 MG/ML
VIAL (ML) INJECTION AS NEEDED
Status: DISCONTINUED | OUTPATIENT
Start: 2024-02-15 | End: 2024-02-15 | Stop reason: SURG

## 2024-02-15 RX ORDER — LIDOCAINE HYDROCHLORIDE 10 MG/ML
INJECTION, SOLUTION EPIDURAL; INFILTRATION; INTRACAUDAL; PERINEURAL AS NEEDED
Status: DISCONTINUED | OUTPATIENT
Start: 2024-02-15 | End: 2024-02-15 | Stop reason: SURG

## 2024-02-15 RX ORDER — LABETALOL 100 MG/1
100 TABLET, FILM COATED ORAL 2 TIMES DAILY
Status: DISCONTINUED | OUTPATIENT
Start: 2024-02-15 | End: 2024-02-18

## 2024-02-15 RX ORDER — SIMETHICONE 80 MG
80 TABLET,CHEWABLE ORAL 3 TIMES DAILY PRN
Status: DISCONTINUED | OUTPATIENT
Start: 2024-02-15 | End: 2024-02-18

## 2024-02-15 RX ORDER — ENOXAPARIN SODIUM 100 MG/ML
40 INJECTION SUBCUTANEOUS DAILY
Status: DISCONTINUED | OUTPATIENT
Start: 2024-02-15 | End: 2024-02-15

## 2024-02-15 RX ORDER — CEFAZOLIN SODIUM/WATER 2 G/20 ML
2 SYRINGE (ML) INTRAVENOUS ONCE
Status: COMPLETED | OUTPATIENT
Start: 2024-02-15 | End: 2024-02-15

## 2024-02-15 RX ORDER — HYDROMORPHONE HYDROCHLORIDE 1 MG/ML
0.4 INJECTION, SOLUTION INTRAMUSCULAR; INTRAVENOUS; SUBCUTANEOUS EVERY 5 MIN PRN
Status: DISCONTINUED | OUTPATIENT
Start: 2024-02-15 | End: 2024-02-15 | Stop reason: HOSPADM

## 2024-02-15 RX ORDER — GABAPENTIN 300 MG/1
300 CAPSULE ORAL EVERY 8 HOURS PRN
Status: DISCONTINUED | OUTPATIENT
Start: 2024-02-15 | End: 2024-02-18

## 2024-02-15 RX ORDER — NICOTINE POLACRILEX 4 MG
30 LOZENGE BUCCAL
Status: DISCONTINUED | OUTPATIENT
Start: 2024-02-15 | End: 2024-02-15 | Stop reason: HOSPADM

## 2024-02-15 RX ORDER — DIPHENHYDRAMINE HYDROCHLORIDE 50 MG/ML
12.5 INJECTION INTRAMUSCULAR; INTRAVENOUS EVERY 4 HOURS PRN
Status: DISCONTINUED | OUTPATIENT
Start: 2024-02-15 | End: 2024-02-18

## 2024-02-15 RX ORDER — DEXAMETHASONE SODIUM PHOSPHATE 4 MG/ML
VIAL (ML) INJECTION AS NEEDED
Status: DISCONTINUED | OUTPATIENT
Start: 2024-02-15 | End: 2024-02-15 | Stop reason: SURG

## 2024-02-15 RX ORDER — DEXTROSE MONOHYDRATE 25 G/50ML
50 INJECTION, SOLUTION INTRAVENOUS
Status: DISCONTINUED | OUTPATIENT
Start: 2024-02-15 | End: 2024-02-18

## 2024-02-15 RX ORDER — LABETALOL 200 MG/1
200 TABLET, FILM COATED ORAL 2 TIMES DAILY
Status: DISCONTINUED | OUTPATIENT
Start: 2024-02-15 | End: 2024-02-15

## 2024-02-15 RX ORDER — ONDANSETRON 2 MG/ML
4 INJECTION INTRAMUSCULAR; INTRAVENOUS ONCE AS NEEDED
Status: DISCONTINUED | OUTPATIENT
Start: 2024-02-15 | End: 2024-02-15 | Stop reason: HOSPADM

## 2024-02-15 RX ORDER — BUPIVACAINE HYDROCHLORIDE 7.5 MG/ML
INJECTION, SOLUTION INTRASPINAL AS NEEDED
Status: DISCONTINUED | OUTPATIENT
Start: 2024-02-15 | End: 2024-02-15 | Stop reason: SURG

## 2024-02-15 RX ORDER — KETOROLAC TROMETHAMINE 30 MG/ML
30 INJECTION, SOLUTION INTRAMUSCULAR; INTRAVENOUS ONCE
Status: COMPLETED | OUTPATIENT
Start: 2024-02-15 | End: 2024-02-15

## 2024-02-15 RX ORDER — MORPHINE SULFATE 2 MG/ML
INJECTION, SOLUTION INTRAMUSCULAR; INTRAVENOUS AS NEEDED
Status: DISCONTINUED | OUTPATIENT
Start: 2024-02-15 | End: 2024-02-15 | Stop reason: SURG

## 2024-02-15 RX ORDER — ENOXAPARIN SODIUM 100 MG/ML
40 INJECTION SUBCUTANEOUS DAILY
Status: DISCONTINUED | OUTPATIENT
Start: 2024-02-15 | End: 2024-02-18

## 2024-02-15 RX ORDER — NICOTINE POLACRILEX 4 MG
30 LOZENGE BUCCAL
Status: DISCONTINUED | OUTPATIENT
Start: 2024-02-15 | End: 2024-02-18

## 2024-02-15 RX ORDER — LEVOTHYROXINE SODIUM 0.1 MG/1
100 TABLET ORAL
Status: DISCONTINUED | OUTPATIENT
Start: 2024-02-16 | End: 2024-02-18

## 2024-02-15 RX ORDER — METOCLOPRAMIDE HYDROCHLORIDE 5 MG/ML
10 INJECTION INTRAMUSCULAR; INTRAVENOUS EVERY 6 HOURS PRN
Status: DISCONTINUED | OUTPATIENT
Start: 2024-02-15 | End: 2024-02-18

## 2024-02-15 RX ORDER — HYDROMORPHONE HYDROCHLORIDE 1 MG/ML
0.2 INJECTION, SOLUTION INTRAMUSCULAR; INTRAVENOUS; SUBCUTANEOUS EVERY 5 MIN PRN
Status: DISCONTINUED | OUTPATIENT
Start: 2024-02-15 | End: 2024-02-15 | Stop reason: HOSPADM

## 2024-02-15 RX ORDER — DEXTROSE MONOHYDRATE 25 G/50ML
50 INJECTION, SOLUTION INTRAVENOUS
Status: DISCONTINUED | OUTPATIENT
Start: 2024-02-15 | End: 2024-02-15 | Stop reason: HOSPADM

## 2024-02-15 RX ORDER — OXYCODONE HYDROCHLORIDE 5 MG/1
5 TABLET ORAL EVERY 6 HOURS PRN
Status: DISCONTINUED | OUTPATIENT
Start: 2024-02-15 | End: 2024-02-18

## 2024-02-15 RX ADMIN — SODIUM CHLORIDE, SODIUM LACTATE, POTASSIUM CHLORIDE, CALCIUM CHLORIDE: 600; 310; 30; 20 INJECTION, SOLUTION INTRAVENOUS at 09:23:00

## 2024-02-15 RX ADMIN — CEFAZOLIN SODIUM/WATER 2 G: 2 G/20 ML SYRINGE (ML) INTRAVENOUS at 08:47:00

## 2024-02-15 RX ADMIN — SODIUM CHLORIDE, SODIUM LACTATE, POTASSIUM CHLORIDE, CALCIUM CHLORIDE: 600; 310; 30; 20 INJECTION, SOLUTION INTRAVENOUS at 08:37:00

## 2024-02-15 RX ADMIN — DEXAMETHASONE SODIUM PHOSPHATE 4 MG: 4 MG/ML VIAL (ML) INJECTION at 08:50:00

## 2024-02-15 RX ADMIN — SODIUM CHLORIDE, SODIUM LACTATE, POTASSIUM CHLORIDE, CALCIUM CHLORIDE: 600; 310; 30; 20 INJECTION, SOLUTION INTRAVENOUS at 10:01:00

## 2024-02-15 RX ADMIN — MORPHINE SULFATE 0.1 MG: 2 INJECTION, SOLUTION INTRAMUSCULAR; INTRAVENOUS at 08:42:00

## 2024-02-15 RX ADMIN — LIDOCAINE HYDROCHLORIDE 3 ML: 10 INJECTION, SOLUTION EPIDURAL; INFILTRATION; INTRACAUDAL; PERINEURAL at 08:41:00

## 2024-02-15 RX ADMIN — BUPIVACAINE HYDROCHLORIDE 1.5 ML: 7.5 INJECTION, SOLUTION INTRASPINAL at 08:42:00

## 2024-02-15 RX ADMIN — PHENYLEPHRINE HCL 50 MCG: 10 MG/ML VIAL (ML) INJECTION at 08:46:00

## 2024-02-15 RX ADMIN — METOCLOPRAMIDE HYDROCHLORIDE 10 MG: 5 INJECTION INTRAMUSCULAR; INTRAVENOUS at 08:45:00

## 2024-02-15 NOTE — PROGRESS NOTES
Pt is a 38 year old female admitted to OR -A.     Chief Complaint   Patient presents with    Scheduled       Pt is  38w3d intra-uterine pregnancy.  History obtained, consents signed. Oriented to room, staff, and plan of care.

## 2024-02-15 NOTE — ANESTHESIA PROCEDURE NOTES
Spinal Block    Performed by: Andrea Olson MD  Authorized by: Andrea Olson MD      General Information and Staff    Start Time:   Anesthesiologist:  Andrea Olson MD  Performed by:  Anesthesiologist  Patient Location:  OR  Site identification: surface landmarks    Reason for Block: surgical anesthesia    Preanesthetic Checklist: patient identified, IV checked, risks and benefits discussed, monitors and equipment checked, pre-op evaluation, timeout performed, anesthesia consent and sterile technique used      Procedure Details    Patient Position:  Sitting  Prep: ChloraPrep    Monitoring:  Cardiac monitor, heart rate and continuous pulse ox  Approach:  Midline  Location:  L4-5  Injection Technique:  Single-shot    Needle    Needle Type:  Sprotte  Needle Gauge:  24 G  Needle Length:  3.5 in    Assessment    Sensory Level:   Events: clear CSF, CSF aspirated, well tolerated and blood negative      Additional Comments

## 2024-02-15 NOTE — TELEPHONE ENCOUNTER
----- Message from Trista Thompson RN sent at 2/15/2024  7:39 AM CST -----    ----- Message -----  From: Antoni Chua MD  Sent: 2/14/2024   8:34 PM CST  To: [facility] [place] Nurse    Hi,   This pt asked me to prescribe her a breast pump to the pharmacy. I did that. Can you ensure I placed the right order?    Thank you!

## 2024-02-15 NOTE — ANESTHESIA POSTPROCEDURE EVALUATION
[facility]    Jenny Clark Patient Status:  Inpatient   Age/Gender 38 year old female MRN [id number]   Location [facility] LABOR & DELIVERY Attending Margaret Sharif MD   Hosp Day # 0 PCP STEPHANIE QUILES       Anesthesia Post-op Note     SECTION    Procedure Summary       Date: 02/15/24 Room / Location:  L+D OR   L+D OR    Anesthesia Start: 837 Anesthesia Stop:     Procedure:  SECTION (Abdomen) Diagnosis: (Same)    Surgeons: Magaly Eldirdge MD Anesthesiologist: Andrea Olson MD    Anesthesia Type: spinal ASA Status: 2            Anesthesia Type: spinal    Vitals Value Taken Time   /69 02/15/24 1012   Temp 97.8 degrees F 02/15/24 1012   Pulse 67 02/15/24 1012   Resp 19 02/15/24 1012   SpO2 99 % 02/15/24 1012   Vitals shown include unfiled device data.    Patient Location: Labor and Delivery    Anesthesia Type: spinal    Airway Patency: patent    Postop Pain Control: adequate    Mental Status: preanesthetic baseline    Nausea/Vomiting: none    Cardiopulmonary/Hydration status: stable euvolemic    Complications: no apparent anesthesia related complications    Postop vital signs: stable    Dental Exam: Unchanged from Preop    Patient to be discharged from PACU when criteria met.

## 2024-02-15 NOTE — PLAN OF CARE
Problem: BIRTH - VAGINAL/ SECTION  Goal: Fetal and maternal status remain reassuring during the birth process  Description: INTERVENTIONS:  - Monitor vital signs  - Monitor fetal heart rate  - Monitor uterine activity  - Monitor labor progression (vaginal delivery)  - DVT prophylaxis (C/S delivery)  - Surgical antibiotic prophylaxis (C/S delivery)  Outcome: Progressing     Problem: PAIN - ADULT  Goal: Verbalizes/displays adequate comfort level or patient's stated pain goal  Description: INTERVENTIONS:  - Encourage pt to monitor pain and request assistance  - Assess pain using appropriate pain scale  - Administer analgesics based on type and severity of pain and evaluate response  - Implement non-pharmacological measures as appropriate and evaluate response  - Consider cultural and social influences on pain and pain management  - Manage/alleviate anxiety  - Utilize distraction and/or relaxation techniques  - Monitor for opioid side effects  - Notify MD/LIP if interventions unsuccessful or patient reports new pain  - Anticipate increased pain with activity and pre-medicate as appropriate  Outcome: Progressing     Problem: ANXIETY  Goal: Will report anxiety at manageable levels  Description: INTERVENTIONS:  - Administer medication as ordered  - Teach and rehearse alternative coping skills  - Provide emotional support with 1:1 interaction with staff  Outcome: Progressing          Problem: POSTPARTUM  Goal: Long Term Goal:Experiences normal postpartum course  Description: INTERVENTIONS:  - Assess and monitor vital signs and lab values.  - Assess fundus and lochia.  - Provide ice/sitz baths for perineum discomfort.  - Monitor healing of incision/episiotomy/laceration, and assess for signs and symptoms of infection and hematoma.  - Assess bladder function and monitor for bladder distention.  - Provide/instruct/assist with pericare as needed.  - Provide VTE prophylaxis as needed.  - Monitor bowel function.  -  Encourage ambulation and provide assistance as needed.  - Assess and monitor emotional status and provide social service/psych resources as needed.  - Utilize standard precautions and use personal protective equipment as indicated. Ensure aseptic care of all intravenous lines and invasive tubes/drains.  - Obtain immunization and exposure to communicable diseases history.  Outcome: Progressing  Goal: Establishment of adequate milk supply with medication/procedure interruptions  Description: INTERVENTIONS:  - Review techniques for milk expression (breast pumping).   - Provide pumping equipment/supplies, instructions, and assistance until it is safe to breastfeed infant.  Outcome: Progressing  Goal: Appropriate maternal -  bonding  Description: INTERVENTIONS:  - Assess caregiver- interactions.  - Assess caregiver's emotional status and coping mechanisms.  - Encourage caregiver to participate in  daily care.  - Assess support systems available to mother/family.  - Provide /case management support as needed.  Outcome: Progressing

## 2024-02-15 NOTE — PLAN OF CARE
Problem: BIRTH - VAGINAL/ SECTION  Goal: Fetal and maternal status remain reassuring during the birth process  Description: INTERVENTIONS:  - Monitor vital signs  - Monitor fetal heart rate  - Monitor uterine activity  - Monitor labor progression (vaginal delivery)  - DVT prophylaxis (C/S delivery)  - Surgical antibiotic prophylaxis (C/S delivery)  Outcome: Progressing     Problem: PAIN - ADULT  Goal: Verbalizes/displays adequate comfort level or patient's stated pain goal  Description: INTERVENTIONS:  - Encourage pt to monitor pain and request assistance  - Assess pain using appropriate pain scale  - Administer analgesics based on type and severity of pain and evaluate response  - Implement non-pharmacological measures as appropriate and evaluate response  - Consider cultural and social influences on pain and pain management  - Manage/alleviate anxiety  - Utilize distraction and/or relaxation techniques  - Monitor for opioid side effects  - Notify MD/LIP if interventions unsuccessful or patient reports new pain  - Anticipate increased pain with activity and pre-medicate as appropriate  Outcome: Progressing     Problem: ANXIETY  Goal: Will report anxiety at manageable levels  Description: INTERVENTIONS:  - Administer medication as ordered  - Teach and rehearse alternative coping skills  - Provide emotional support with 1:1 interaction with staff  Outcome: Progressing     Problem: Patient/Family Goals  Goal: Patient/Family Long Term Goal  Description: Patient's Long Term Goal    Interventions:    - See additional Care Plan goals for specific interventions  Outcome: Progressing  Goal: Patient/Family Short Term Goal  Description: Patient's Short Term Goal:    Interventions:   -  - See additional Care Plan goals for specific interventions  Outcome: Progressing

## 2024-02-15 NOTE — OPERATIVE REPORT
[facility]   Section - Operative Note    Jenny Clark Patient Status:  Inpatient    1985 MRN [id number]   Location [facility] LABOR & DELIVERY Attending Margaret Sharif MD   Hosp Day # 0 PCP STEPHANIE QUILES       Preoperative Diagnosis:   taylor IUP at 38w3d   History of 2 prior c-sections  Chronic hypertension  Type 2 diabetes     Postoperative Diagnosis: Same with live     Procedure:   Repeat Low Transverse  Section    Primary surgeon: Magaly Jessica MD      Assistant:  Jose Eduardo Shepherd MD    Indications:  Jenny Clark is a 38 year old  at 38w3d admitted for repeat .      Surgical Findings:   Paco Clark [[id number]]      Labor Events     labor?: No   steroids?: None  Antibiotics received during labor?: Yes  Antibiotics (enter # doses in comment): cefazolin       Rupture type: AROM  Intrapartum & labor complications: Chronic hypertension  Intrapartum & labor complications comment: GDM        Presentation    No data filed       Operative Delivery    Operative Vaginal Delivery: N/A                      Shoulder Dystocia    Shoulder Dystocia: N/A             Anesthesia    Method: Spinal              [place] Delivery      Head delivery date/time: 2/15/2024 09:14:17   Delivery date/time:  2/15/24 09:14:27   Delivery type: Caesarean Section    Details:  Trial of labor after : No    categorization: Repeat    priority: scheduled   Indications for : Prior Uterine Surgery   Skin incision type: 1 Pfannenstiel   Uterine Incision type: Low Transverse      Delivery location: OR       Delivery Providers    Delivering Clinician: Mgaaly Jessica MD   Delivery personnel:  Provider Role   Arlyn Campos RN Baby Nurse   Ondina Chicas RN Delivery Nurse   Debbie Medina MD Neonatologist             Cord    Vessels: 3 Vessels  Timed cord clamping: Yes  Cord blood disposition: to lab  Gases  sent?: No       Resuscitation    Method: Oxygen, Suctioning       [place] Measurements      Weight: 4620 g 10 lb 3 oz Length: 53.3 cm     Head circum.: 36 cm Chest circum.: 38 cm          Abdominal circum.: 36 cm           Placenta    Date/time: 2/15/2024 0916  Removal: Manual Removal  Disposition: Pathology       Apgars    Living status: Living   Apgar Scoring Key:    0 1 2    Skin color Blue or pale Acrocyanotic Completely pink    Heart rate Absent <100 bpm >100 bpm    Reflex irritability No response Grimace Cry or active withdrawal    Muscle tone Limp Some flexion Active motion    Respiratory effort Absent Weak cry; hypoventilation Good, crying              1 Minute:  5 Minute:  10 Minute:  15 Minute:  20 Minute:      Skin color: 0  1       Heart rate: 2  2       Reflex irritablity: 2  2       Muscle tone: 2  2       Respiratory effort: 2  2       Total: 8  9          Apgars assigned by: DR VU   disposition: with mother       Skin to Skin    Skin to skin initiated date/time: 2/15/2024  Skin to skin with: Mother  Reason skin to skin not initiated:  Acuity       Vaginal Count    No data filed       Delivery (Maternal)    Episiotomy: None  Perineal lacerations: None      Vaginal laceration?: No      Cervical laceration?: No    Clitoral laceration?: No    Estimated blood loss (mL): 600                 Procedure:   The patient was prepped and draped in the usual sterile fashion. A time out was performed and scd’s were placed to decrease her risk for DVT and a dose of antibiotics was given preoperatively to decrease her risk for infection. A fournier catheter was placed. After adequate level of anesthesia was ascertained, a Pfannenstiel incision was made and extended down to the level of the fascia. The fascia was incised and extended laterally with Gómez scissors.  The rectus muscles were  superiorly and inferiorly from the underlying fascia.  The peritoneal cavity was entered superiorly and  extended inferiorly.     The bladder retractor was placed was placed and the bladder flap was developed. A low transverse incision was created using the scalpel and extended laterally bluntly. The amniotic fluid was clear. The infants head was then brought to the incision, Mity-vac used to elevate fetal head to incision, and delivered with fundal pressure given by the assistant. No nuchal cord. The infant's nose and mouth were bulb suctioned. The cord was clamped and cut after delay. Vigorous cry. Cord blood obtained. The infant was placed in the radiant warmer with Neonatology present.      The placenta was delivered intact with a 3 vessel cord. The uterus was exteriorized. The uterine cavity was swept clean using a wet lap. The first layer of the hysterotomy was closed using 0 Vicryl.  A second imbricating layer was placed with 0 Monocryl. The incision was inspected for hemostasis. Tubes and ovaries appeared normal. The uterus was then returned to the abdomen.      The peritoneum and rectus muscles were examined and found to be hemostatic. The fascia was closed with 0 PDS in a running fashion. The subcutaneous tissue was closed with 3-0 vicryl. The skin was closed with 3-0 monocryl.      The sponge and instrument counts were correct x3 and scanner was clear. EBL 600cc.  The patient tolerated the procedure and brought in stable condition to the recovery room. Baby \"Carlos Jr\" brought to NICU for further evaluation.    Drains: fournier to dependant drainage - with clear urine    Condition:   stable    Complications: None      Magaly Jessica MD  [facility]  2/15/2024 10:11 AM

## 2024-02-15 NOTE — H&P
[facility]  Obstetrics and Gynecology  History & Physical    Jenny Clark Patient Status:  Inpatient    1985 MRN [id number]   Location [facility] LABOR & DELIVERY Attending Margaret Sharif MD   Hospital Day 0 PCP STEPHANIE QUILES     CC: Patient is here for  section     SUBJECTIVE:    Jenny Clark is a 38 year old  female at 38w3d with Estimated Date of Delivery: 24 who is being admitted for repeat  section for chronic HTN, type 2 DM, h/o prior CS x2.  She denies ctx, LOF, VB, or decreased FM.    MORE Confirmation  LMP: Patient's last menstrual period was 2023.  MORE: 2024, by Last Menstrual Period     OB Ultrasounds:   24 MFM  Ultrasound Findings:  Single IUP in cephalic presentation.    Placenta is anterior.   A 3 vessel cord is noted.  Cardiac activity is present at 158 bpm  EFW 3408 g ( 7 lb 8 oz); 84%.  AC >99%  SUNSHINE is  15.6 cm.  MVP is 5 cm  BPP is 8/8.       Obstetric History:   OB History    Para Term  AB Living   3 2 2     2   SAB IAB Ectopic Multiple Live Births           2      # Outcome Date GA Lbr Joni/2nd Weight Sex Delivery Anes PTL Lv   3 Current            2 Term 06   10 lb 10 oz (4.819 kg) F CS-Unspec EPI  THERESA   1 Term 03   9 lb 5 oz (4.224 kg) F CS-Unspec EPI N THERESA     Past Medical History:   Past Medical History:   Diagnosis Date    Anemia     Diabetes (HCC)     Essential hypertension 2022    Hypothyroidism     Inflammatory arthritis     Spondyloarthritis     Thyroid disease      Past Social History:   Past Surgical History:   Procedure Laterality Date    APPENDECTOMY            HC  SECTION LEVEL I       Family History:   Family History   Problem Relation Age of Onset    No Known Problems Father     Diabetes Mother      Social History:   Social History     Tobacco Use    Smoking status: Never    Smokeless tobacco: Never   Substance Use Topics    Alcohol use: Not Currently        Home Meds:   Medications Prior to Admission   Medication Sig Dispense Refill Last Dose    Ferrous Sulfate 324 (65 Fe) MG Oral Tab EC Take by mouth.   2/14/2024    Prenatal MV-Min-Fe Fum-FA-DHA (PRENATAL 1 OR) Take by mouth.   2/14/2024    labetalol 200 MG Oral Tab Take 1 tablet (200 mg total) by mouth 2 (two) times daily.   2/14/2024    levothyroxine 100 MCG Oral Tab Take 1 tablet (100 mcg total) by mouth daily.   2/15/2024    Misc. Devices (BREAST PUMP) Does not apply Misc Any pump covered by insurance 1 each 0     doxylamine-pyridoxine 10-10 MG Oral Tab EC Take 2 tablets by mouth nightly. Can take an additional tablet in the morning and another at noon 240 tablet 0 More than a month    Microlet Lancets Does not apply Misc Check blood sugar 4 times daily 400 each 3     docusate sodium (COLACE) 100 MG Oral Cap Take 1 capsule every day by oral route as needed for 30 days.   More than a month    Polyethylene Glycol 3350 (MIRALAX OR) as needed.   More than a month    Insulin Pen Needle 32G X 4 MM Does not apply Misc May substitute if not on insurance formulary 100 each 5     ondansetron (ZOFRAN) 4 mg tablet Take 1 tablet (4 mg total) by mouth every 8 (eight) hours as needed for Nausea. 30 tablet 3 More than a month    Scopolamine 1.5mg TD patch 1mg/3days Place 1 patch onto the skin every third day. 10 patch 3 More than a month     Allergies: No Known Allergies    OBJECTIVE:    Temp:  [98.1 °F (36.7 °C)] 98.1 °F (36.7 °C)  BP: (127)/(79) 127/79  Body mass index is 38.04 kg/m².    General: AAO. NAD.   Lungs: CTAB  CV: regular rate and rhythm  Abdomen: soft, nontender, gravid   Extremities: negative edema bilaterally, negative calf tenderness bilaterally  SVE: deferred    FHT: baseline 130, moderate variability, 15x15 accels, no decels  TOCO: irregular      Prenatal Labs Brief Review   Blood Type:   Lab Results   Component Value Date    ABO A 02/15/2024    RH Positive 02/15/2024         Inpatient labs:  Lab Results    Component Value Date    WBC 5.3 02/15/2024    HGB 11.5 02/15/2024    HCT 35.1 02/15/2024    .0 02/15/2024         ASSESSMENT/ PLAN:    Jenny Clark is a 38 year old  female at 38w3d Estimated Date of Delivery: 24 who is being admitted for  section    Patient Active Problem List   Diagnosis    Type II diabetes mellitus, well controlled (HCC)    Rheumatoid factor positive    Osteoarthrosis    Obesity affecting pregnancy, antepartum    Hyperproteinemia    Morning sickness    Iron deficiency anemia    Inflammatory polyarthropathy (HCC)    Hypothyroidism    Abnormal antibody titer    Antepartum multigravida of advanced maternal age    Medication exposure during first trimester of pregnancy    Spondylarthritis    Pre-existing essential hypertension during pregnancy    Hirsutism    H/O CS x2    Pre-existing type 2 diabetes mellitus during pregnancy, antepartum    Low-lying placenta- RESOLVED    Request for sterilization    Supervision of high risk pregnancy in third trimester    Pregnancy       1. Preoperative:   - h/o CS x2  - declines permanent sterilization  - Preoperative antibiotics: 2g Ancef   - LABS: CBC, Type and Screen ordered on admission  - IVF: 1 L LR Bolus then at 125 cc/hr  - DIET: NPO except meds  2. Fetal monitoring: CEFM, reactive cat 1 on admission  3. Pain: Anesthesia to evaluate, plan for spinal  4. Co-morbidities   - chronic HTN - continue labetalol   - type 2 DM on insulin, per MFM   - hypothyroidism - continue Synthroid   - Body mass index is 38.04 kg/m².     Risks, benefits, alternatives and possible complications have been discussed in detail with the patient including but not limited to bleeding, infection, injury to local organs (bowel/ bladder). Pt desires to proceed w/ surgery as scheduled. Pre-admission, admission, and post admission procedures and expectations were discussed in detail.  All questions answered, all appropriate consents will be signed at the  Hospital. Admission is planned for today. Delivery via CS anticipated.    Magaly Jessica MD  [facility] - OBGYN  2/15/2024 7:54 AM

## 2024-02-15 NOTE — ANESTHESIA PREPROCEDURE EVALUATION
PRE-OP EVALUATION    Patient Name: Jenny Clark    Admit Diagnosis: Pregnancy [Z34.90]    Pre-op Diagnosis: 38 week 3 day intrauterine pregnancy. Repeat  section due to prior uterine surgery, CHTN, GDM.     SECTION    Anesthesia Procedure:  SECTION (Abdomen)    Surgeon(s) and Role:     * Magaly Eldridge MD - Primary     * Jose Eduardo Shepherd MD - Assisting Surgeon    Pre-op vitals reviewed.  Temp: 98.1 °F (36.7 °C)  BP: 127/79     Body mass index is 38.04 kg/m².    Current medications reviewed.  Hospital Medications:   lactated ringers IV bolus 1,000 mL  1,000 mL Intravenous Once    Followed by    lactated ringers infusion  125 mL/hr Intravenous Continuous    [COMPLETED] acetaminophen (Tylenol Extra Strength) tab 1,000 mg  1,000 mg Oral Once    ondansetron (Zofran) 4 MG/2ML injection 4 mg  4 mg Intravenous Q6H PRN    sodium citrate-citric acid (Bicitra) 500-334 MG/5ML oral solution 30 mL  30 mL Oral Once    oxyTOCIN in sodium chloride 0.9% (Pitocin) 30 Units/500mL infusion premix  62.5-900 caron-units/min Intravenous Continuous    ceFAZolin (Ancef) 2 g in 20mL IV syringe premix  2 g Intravenous Once       Outpatient Medications:     Medications Prior to Admission   Medication Sig Dispense Refill Last Dose    Ferrous Sulfate 324 (65 Fe) MG Oral Tab EC Take by mouth.   2024    Prenatal MV-Min-Fe Fum-FA-DHA (PRENATAL 1 OR) Take by mouth.   2024    labetalol 200 MG Oral Tab Take 1 tablet (200 mg total) by mouth 2 (two) times daily.   2024    levothyroxine 100 MCG Oral Tab Take 1 tablet (100 mcg total) by mouth daily.   2/15/2024    Misc. Devices (BREAST PUMP) Does not apply Misc Any pump covered by insurance 1 each 0     doxylamine-pyridoxine 10-10 MG Oral Tab EC Take 2 tablets by mouth nightly. Can take an additional tablet in the morning and another at noon 240 tablet 0 More than a month    Microlet Lancets Does not apply Misc Check blood sugar 4 times daily 400 each 3      docusate sodium (COLACE) 100 MG Oral Cap Take 1 capsule every day by oral route as needed for 30 days.   More than a month    Polyethylene Glycol 3350 (MIRALAX OR) as needed.   More than a month    Insulin Pen Needle 32G X 4 MM Does not apply Misc May substitute if not on insurance formulary 100 each 5     ondansetron (ZOFRAN) 4 mg tablet Take 1 tablet (4 mg total) by mouth every 8 (eight) hours as needed for Nausea. 30 tablet 3 More than a month    Scopolamine 1.5mg TD patch 1mg/3days Place 1 patch onto the skin every third day. 10 patch 3 More than a month       Allergies: Patient has no known allergies.      Anesthesia Evaluation    Patient summary reviewed.    Anesthetic Complications  (-) history of anesthetic complications         GI/Hepatic/Renal                                 Cardiovascular        Exercise tolerance: good     MET: >4      (+) hypertension                       (-) angina              Endo/Other      (+) diabetes  type 2,                          Pulmonary                           Neuro/Psych                              HLA B27 polyarthropathy- follows with rheumatology         Past Surgical History:   Procedure Laterality Date    APPENDECTOMY            HC  SECTION LEVEL I       Social History     Socioeconomic History    Marital status:    Tobacco Use    Smoking status: Never    Smokeless tobacco: Never   Vaping Use    Vaping Use: Never used   Substance and Sexual Activity    Alcohol use: Not Currently    Drug use: Not Currently    Sexual activity: Yes     Partners: Male   Other Topics Concern    Caffeine Concern No    Exercise No    Seat Belt Yes     History   Drug Use Unknown     Available pre-op labs reviewed.  Lab Results   Component Value Date    WBC 5.3 02/15/2024    RBC 4.04 02/15/2024    HGB 11.5 (L) 02/15/2024    HCT 35.1 02/15/2024    MCV 86.9 02/15/2024    MCH 28.5 02/15/2024    MCHC 32.8 02/15/2024    RDW 19.5 02/15/2024    .0 02/15/2024                Airway      Mallampati: III  Mouth opening: 3 FB  TM distance: 4 - 6 cm  Neck ROM: full Cardiovascular      Rhythm: regular  Rate: normal     Dental             Pulmonary      Breath sounds clear to auscultation bilaterally.               Other findings              ASA: 2   Plan: spinal  NPO status verified and patient meets guidelines.  Patient has taken beta blockers in last 24 hours.  Post-procedure pain management plan discussed with surgeon and patient.    Comment: Benefits of spinal anesthesia over the general anesthesia for  section was discussed with Jenny Clark and her . Realistic expectations of spinal anesthesia were also discussed including  temporary inability to feel and move lower extremities, possibly uncomfortable feel of \"pressure\" during certain parts of the procedure, possible sudden nausea/vomiting, pruritus, PPDH as well as other serious but rare complications including, allergic reaction to medications, infection, epidural hematoma, nerve/cord injury. Possibility of spinal anesthetic failure necessitating additional sedating/analgesic medications, and/or conversion to GA was also discussed. Patient verbalizes understanding of risks. All questions were answered. Agreed to proceed with procedure as planned under spinal anesthesia.    GA backup  Plan/risks discussed with: patient and spouse                Present on Admission:   H/O CS x2   Request for sterilization   Pre-existing essential hypertension during pregnancy   Pre-existing type 2 diabetes mellitus during pregnancy, antepartum

## 2024-02-15 NOTE — PROGRESS NOTES
Patient transferred to mother/baby room 1107 per cart in stable condition with baby and personal belongings.  Accompanied by  and staff.  Report given to mother/baby RN.

## 2024-02-16 LAB
BASOPHILS # BLD AUTO: 0.01 X10(3) UL (ref 0–0.2)
BASOPHILS NFR BLD AUTO: 0.1 %
EOSINOPHIL # BLD AUTO: 0.04 X10(3) UL (ref 0–0.7)
EOSINOPHIL NFR BLD AUTO: 0.5 %
ERYTHROCYTE [DISTWIDTH] IN BLOOD BY AUTOMATED COUNT: 19.7 %
GLUCOSE BLD-MCNC: 123 MG/DL (ref 70–99)
GLUCOSE BLD-MCNC: 161 MG/DL (ref 70–99)
GLUCOSE BLD-MCNC: 85 MG/DL (ref 70–99)
GLUCOSE BLD-MCNC: 88 MG/DL (ref 70–99)
HCT VFR BLD AUTO: 30.5 %
HGB BLD-MCNC: 9.9 G/DL
IMM GRANULOCYTES # BLD AUTO: 0.04 X10(3) UL (ref 0–1)
IMM GRANULOCYTES NFR BLD: 0.5 %
LYMPHOCYTES # BLD AUTO: 0.96 X10(3) UL (ref 1–4)
LYMPHOCYTES NFR BLD AUTO: 13.2 %
MCH RBC QN AUTO: 29 PG (ref 26–34)
MCHC RBC AUTO-ENTMCNC: 32.5 G/DL (ref 31–37)
MCV RBC AUTO: 89.4 FL
MONOCYTES # BLD AUTO: 0.44 X10(3) UL (ref 0.1–1)
MONOCYTES NFR BLD AUTO: 6 %
NEUTROPHILS # BLD AUTO: 5.8 X10 (3) UL (ref 1.5–7.7)
NEUTROPHILS # BLD AUTO: 5.8 X10(3) UL (ref 1.5–7.7)
NEUTROPHILS NFR BLD AUTO: 79.7 %
PLATELET # BLD AUTO: 218 10(3)UL (ref 150–450)
RBC # BLD AUTO: 3.41 X10(6)UL
WBC # BLD AUTO: 7.3 X10(3) UL (ref 4–11)

## 2024-02-16 NOTE — PROGRESS NOTES
[facility]SW-J karime    Jenny Clark Patient Status:  Inpatient   Age/Gender 38 year old female MRN [id number]   Location [facility]SW-J Attending Margaret Sharif MD   Hosp Day # 1 PCP STEPHANIE QUILES      Anesthesia Pain Progress Note    Anesthesia Technique:   Spinal Anesthesia     Pain Management Technique:  In addition to available oral supplemental and IV medications  Patient received neuraxial preservative free morphine for post procedural pain control.    Post Procedure Pain Quality:    Adequate    Pain Management Side Effects:  None     /85 (BP Location: Right arm)   Pulse 87   Temp 97.3 °F (36.3 °C) (Oral)   Resp 16   Wt 94.3 kg (208 lb)   LMP 2023   SpO2 100%   Breastfeeding Yes   BMI 38.04 kg/m²       Injection Site: Injection site is intact on inspection     Complications from Pain Management or Anesthesia:   None    All patient questions were answered.  Follow up pain management is separate from intraoperative anesthetic needs.  Pain care is transitioned to primary service, with management by oral medications.    Thank you for asking us to participate in the care of your patient.    Martha Douglas MD, 24, 9:42 AM      Martha Douglas MD, 24, 9:42 AM

## 2024-02-16 NOTE — CM/SW NOTE
met with patient (Jenny) to review insurance and PCP for infant in NICU. Infant will be added to [employer] PPO plan that Jenny delivered under.  reviewed insurance Auth process and discharge planning process. PCP for infant will be Dr Meggan Moseley in [facility]. Jenny plans on doing both breast milk and formula to meet infant needs. Jenny confirmed that she has a breast pump at home. Jenny also has [place] and does have [facility] services which she will follow up on. Patient has crib and car seat for infant. Patient does not at this time have concerns for housing, utilities, food, cost of medications,or transportation.  asked if patient had any other questions/ concerns at this time? Patient stated no, not at this time.

## 2024-02-16 NOTE — PROGRESS NOTES
Patient ate breakfast prior to accucheck. Patient states she forgot to call prior to eating. Patient educated to call prior to next meal.

## 2024-02-16 NOTE — PROGRESS NOTES
OB progress note    Subjective: Pain is controlled. Lochia normal. Tolerating PO. Passing flatus. Voiding freely. Is ambulating.     Vitals:    24 0310 24 0600 24 0605 24 0734   BP:  (!) 128/92 126/86 125/85   BP Location:    Right arm   Pulse: 87 85 84 87   Resp:    16   Temp:   97.8 °F (36.6 °C) 97.3 °F (36.3 °C)   TempSrc:   Oral Oral   SpO2: 99%  99% 100%   Weight:           Exam:  Gen: NAD, appears well  Abd soft, minimally tender, fundus firm under umbilicus  Incision: c/d/i    Intake/Output                   24 0700 - 02/15/24 0659 (Not Admitted) 02/15/24 07 - 24 0659 24 07 - 24 0659       Intake    I.V.  --  2942.7  --    Volume (mL) Oxytocin -- 1042.7 --    Volume (mL) (lactated ringers infusion) -- 1900 --    IV PIGGYBACK  --  20  --    Volume (mL) (ceFAZolin (Ancef) 2 g in 20mL IV syringe premix) -- 20 --    Total Intake -- 2962.7 --       Output    Urine  --  900  --    Output (mL) (Urethral Catheter Double-lumen) -- 900 --    Stool  --  --  --    Stool Count Calculated for I/O -- -- 1 x    Blood  --  600  --    Estimated Blood Loss -- 600 --    Total Output -- 1500 --       Net I/O     -- 1462.7 --             Recent Labs   Lab 24  0644   WBC 7.3   HGB 9.9*   .0   NE 5.80       A/P: 38 year old  now POD#1 s/p repeat  section    Afebrile, VSS  POD1 Hgb 9.9 - ordered venofer  Chronic HTN on labetalol 100 BID - Bps mostly normal range  T2DM on metformin with insulin SS - MFM managing  Doing well, meeting postop milestones  Rh pos    Lakeshia Stevenson MD

## 2024-02-17 PROBLEM — O10.019: Status: RESOLVED | Noted: 2023-07-21 | Resolved: 2024-02-17

## 2024-02-17 PROBLEM — O09.529 ANTEPARTUM MULTIGRAVIDA OF ADVANCED MATERNAL AGE (HCC): Status: RESOLVED | Noted: 2023-07-21 | Resolved: 2024-02-17

## 2024-02-17 PROBLEM — Z34.90 PREGNANCY (HCC): Status: RESOLVED | Noted: 2024-02-15 | Resolved: 2024-02-17

## 2024-02-17 PROBLEM — R76.0 ABNORMAL ANTIBODY TITER: Status: RESOLVED | Noted: 2018-05-02 | Resolved: 2024-02-17

## 2024-02-17 PROBLEM — O44.40 LOW-LYING PLACENTA: Status: RESOLVED | Noted: 2023-10-10 | Resolved: 2024-02-17

## 2024-02-17 PROBLEM — O09.93 SUPERVISION OF HIGH RISK PREGNANCY IN THIRD TRIMESTER (HCC): Status: RESOLVED | Noted: 2023-12-18 | Resolved: 2024-02-17

## 2024-02-17 PROBLEM — O09.891 MEDICATION EXPOSURE DURING FIRST TRIMESTER OF PREGNANCY: Status: RESOLVED | Noted: 2023-07-21 | Resolved: 2024-02-17

## 2024-02-17 PROBLEM — O09.93 SUPERVISION OF HIGH RISK PREGNANCY IN THIRD TRIMESTER: Status: RESOLVED | Noted: 2023-12-18 | Resolved: 2024-02-17

## 2024-02-17 PROBLEM — O09.529 ANTEPARTUM MULTIGRAVIDA OF ADVANCED MATERNAL AGE: Status: RESOLVED | Noted: 2023-07-21 | Resolved: 2024-02-17

## 2024-02-17 PROBLEM — O24.119 PRE-EXISTING TYPE 2 DIABETES MELLITUS DURING PREGNANCY, ANTEPARTUM (HCC): Status: RESOLVED | Noted: 2023-10-10 | Resolved: 2024-02-17

## 2024-02-17 PROBLEM — O24.119 PRE-EXISTING TYPE 2 DIABETES MELLITUS DURING PREGNANCY, ANTEPARTUM: Status: RESOLVED | Noted: 2023-10-10 | Resolved: 2024-02-17

## 2024-02-17 PROBLEM — O21.0: Status: RESOLVED | Noted: 2023-07-05 | Resolved: 2024-02-17

## 2024-02-17 PROBLEM — O44.40 LOW-LYING PLACENTA (HCC): Status: RESOLVED | Noted: 2023-10-10 | Resolved: 2024-02-17

## 2024-02-17 PROBLEM — O09.891 MEDICATION EXPOSURE DURING FIRST TRIMESTER OF PREGNANCY (HCC): Status: RESOLVED | Noted: 2023-07-21 | Resolved: 2024-02-17

## 2024-02-17 LAB
GLUCOSE BLD-MCNC: 111 MG/DL (ref 70–99)
GLUCOSE BLD-MCNC: 82 MG/DL (ref 70–99)
GLUCOSE BLD-MCNC: 86 MG/DL (ref 70–99)
GLUCOSE BLD-MCNC: 88 MG/DL (ref 70–99)
GLUCOSE BLD-MCNC: 93 MG/DL (ref 70–99)

## 2024-02-17 RX ORDER — IBUPROFEN 600 MG/1
600 TABLET ORAL EVERY 6 HOURS
Qty: 20 TABLET | Refills: 0 | Status: SHIPPED | OUTPATIENT
Start: 2024-02-17

## 2024-02-17 RX ORDER — ACETAMINOPHEN 500 MG
1000 TABLET ORAL EVERY 6 HOURS
Qty: 20 TABLET | Refills: 0 | Status: SHIPPED | OUTPATIENT
Start: 2024-02-17

## 2024-02-17 RX ORDER — GABAPENTIN 300 MG/1
300 CAPSULE ORAL EVERY 8 HOURS PRN
Qty: 20 CAPSULE | Refills: 0 | Status: SHIPPED | OUTPATIENT
Start: 2024-02-17

## 2024-02-17 NOTE — DISCHARGE SUMMARY
[facility]    Discharge Summary    Jenny Clark Patient Status:  Inpatient    1985 MRN [id number]   Location [facility] 1SW-J Attending Margaret Sharif MD   Hosp Day # 3 PCP STEPHANIE QUILES       Date of Admission: 2/15/2024    Date of Discharge: 2024    Patient is a 38 year old s/p repeat low transverse  at 38w3d.  Delivery was uncomplicated and in the post partum patient was started on labetalol 200 BID for chronic hypertension. On post partum day 2, patient had met goals of post partum care and was discharged home with planned follow up with her OB provider in within 2weeks.      Date of Delivery: 2/15/2024   Time of Delivery: 9:14 AM     Delivery Type: Caesarean Section     Baby: Liveborn      Apgars:  1 minute:   8                  5 minutes: 9                           10 minutes:

## 2024-02-17 NOTE — PROGRESS NOTES
OB progress note    Subjective: Pain is controlled. Lochia normal. Tolerating PO. Passing flatus. Voiding freely. Is ambulating. Is about to see her baby who is currently in the NICU.  Reports that baby's O2 requirement now only 2L.      Vitals:    24 2223 24 0625 24 0800 24 1600   BP: 137/82 131/73 139/81 139/79   BP Location: Right arm Right arm Right arm Right arm   Pulse: 90 80 79 85   Resp:  18    Temp: 99.4 °F (37.4 °C)  98.2 °F (36.8 °C)    TempSrc: Oral  Oral    SpO2:       Weight:           Exam:  Gen: NAD, appears well  Abd soft, minimally tender, fundus firm under umbilicus  Incision: c/d/i    Intake/Output                   24 0700 - 02/15/24 0659 (Not Admitted) 02/15/24 0700 - 24 0659 24 0700 - 24 0659       Intake    I.V.  --  2942.7  --    Volume (mL) Oxytocin -- 1042.7 --    Volume (mL) (lactated ringers infusion) -- 1900 --    IV PIGGYBACK  --  20  --    Volume (mL) (ceFAZolin (Ancef) 2 g in 20mL IV syringe premix) -- 20 --    Total Intake -- 2962.7 --       Output    Urine  --  900  --    Output (mL) (Urethral Catheter Double-lumen) -- 900 --    Stool  --  --  --    Stool Count Calculated for I/O -- -- 1 x    Blood  --  600  --    Estimated Blood Loss -- 600 --    Total Output -- 1500 --       Net I/O     -- 1462.7 --             Recent Labs   Lab 24  0644   WBC 7.3   HGB 9.9*   .0   NE 5.80       A/P: 38 year old  now POD#2 s/p repeat  section    Afebrile, VSS  POD1 Hgb 9.9 - s/p venofer  Chronic HTN on labetalol 100 BID - Bps WNL today  T2DM on metformin with insulin SS - Normal CBGs  Doing well, meeting postop milestones  Rh pos    Patient desires discharge tomorrow.  I will place order this evening.

## 2024-02-18 VITALS
RESPIRATION RATE: 18 BRPM | DIASTOLIC BLOOD PRESSURE: 79 MMHG | SYSTOLIC BLOOD PRESSURE: 135 MMHG | BODY MASS INDEX: 38 KG/M2 | TEMPERATURE: 98 F | WEIGHT: 208 LBS | OXYGEN SATURATION: 100 % | HEART RATE: 86 BPM

## 2024-02-18 LAB
GLUCOSE BLD-MCNC: 86 MG/DL (ref 70–99)
GLUCOSE BLD-MCNC: 92 MG/DL (ref 70–99)

## 2024-02-18 RX ORDER — LABETALOL 200 MG/1
200 TABLET, FILM COATED ORAL 2 TIMES DAILY
Status: DISCONTINUED | OUTPATIENT
Start: 2024-02-18 | End: 2024-02-18

## 2024-02-18 RX ORDER — LABETALOL 200 MG/1
200 TABLET, FILM COATED ORAL 2 TIMES DAILY
Qty: 60 TABLET | Refills: 0 | Status: SHIPPED | OUTPATIENT
Start: 2024-02-18 | End: 2024-03-19

## 2024-02-18 NOTE — PROGRESS NOTES
NURSING DISCHARGE NOTE    Discharged Home via Ambulatory.  Accompanied by Support staff  Belongings Taken by patient/family. Discharge instructions reviewed, pt states understanding. Pt leaving unit to visit infant in NICU

## 2024-02-20 ENCOUNTER — TELEPHONE (OUTPATIENT)
Dept: OBGYN CLINIC | Facility: CLINIC | Age: 39
End: 2024-02-20

## 2024-02-20 ENCOUNTER — TELEPHONE (OUTPATIENT)
Dept: OBGYN UNIT | Facility: HOSPITAL | Age: 39
End: 2024-02-20

## 2024-02-20 NOTE — TELEPHONE ENCOUNTER
[facility] request approval  REQ ID: [id number]  Total units: 4  Eff dates: 02/15/24-02/19/2024

## 2024-02-20 NOTE — PROGRESS NOTES
Cradle call completed. Mom care reviewed, baby still in NICU. All questions answered. Cradle call letters sent via [employer].

## 2024-03-28 ENCOUNTER — POSTPARTUM (OUTPATIENT)
Dept: OBGYN CLINIC | Facility: CLINIC | Age: 39
End: 2024-03-28
Payer: MEDICAID

## 2024-03-28 VITALS
SYSTOLIC BLOOD PRESSURE: 110 MMHG | DIASTOLIC BLOOD PRESSURE: 60 MMHG | WEIGHT: 173 LBS | BODY MASS INDEX: 31.83 KG/M2 | HEIGHT: 62 IN

## 2024-03-28 RX ORDER — LOSARTAN POTASSIUM 100 MG/1
TABLET ORAL
COMMUNITY
Start: 2024-03-18

## 2024-03-28 RX ORDER — METFORMIN HYDROCHLORIDE 500 MG/1
TABLET, EXTENDED RELEASE ORAL
COMMUNITY
Start: 2024-03-18

## 2024-03-28 NOTE — PROGRESS NOTES
[facility]  Obstetrics and Gynecology   Postpartum Progress Note    Subjective:     Jenny Clark is a 38 year old  female who is s/p RLTCS on 2/15/2024. Her pregnancy was complicated by advanced maternal age, Type 2 Diabetes, thyroid disease, anemia, Hypertension, and prior C/S x 2. She reports doing well. Baby is doing well and bottle feeding. The patient reports vagina bleeding stopped at 4 weeks then she got her menses 4 days ago. The patient denies emotional concerns.     Review of Systems:  General: denies fevers, chills, fatigue and malaise.   Respiratory: denies SOB, dyspnea, cough or wheezing  Cardiovascular: denies chest pain, palpitations, exercise intolerance   GI:denies abdominal pain, diarrhea, constipation  : denies dysuria, hematuria, increased urinary frequency. denies abnormal uterine bleeding or vaginal discharge.     Objective:     Vitals:    24 1543   BP: 110/60   Weight: 173 lb (78.5 kg)   Height: 62\"     Body mass index is 31.64 kg/m².    General: AAO.NAD.   CVS exam: normal peripheral perfusion  Chest: non-labored breathing, no tachypnea   Abdominal exam: soft, nontender, nondistended  Incision: clean, dry and intact. Well healed without signs of infection.   Pelvic exam:   VULVA: normal appearing vulva with no masses, tenderness or lesions  PERINEUM: intact, well healed, no signs of infection.   VAGINA: normal appearing vagina with normal color and discharge, no lesions  CERVIX: normal appearing cervix without discharge or lesions  UTERUS: uterus is normal size, shape, consistency and nontender  ADNEXA: normal adnexa in size, nontender and no masses  Ext: non-tender, no edema    Labs:         Assessment:     Jenny Clark is a 38 year old  female who presents for postpartum visit   Patient Active Problem List   Diagnosis    Type II diabetes mellitus, well controlled (HCC)    Rheumatoid factor positive    Osteoarthrosis    Hyperproteinemia    Iron deficiency  anemia    Inflammatory polyarthropathy (HCC)    Hypothyroidism    Spondylarthritis    Hirsutism    H/O CS x2    Request for sterilization    Status post repeat low transverse  section         Plan:     Postpartum exam   - s/p RLTCS   - doing well, no complaints   - no abnormal findings on physical exam   - may return to normal activity   Contraception counseling   - discussion held with patient about family planning and contraception  - pt reports her SO is getting a vasectomy. He is also on medication for his prostate. I advised she still use condoms to be cautious.    All of the findings and plan were discussed with the patient.  She notes understanding and agrees with the plan of care.  All questions were answered to the best of my ability at this time.    RTC in 6-12 months for well woman exam or sooner if needed     ASHLEY Pirce  [facility] - OBGYN             Note to patient and family   The 21st Century Cures Act makes medical notes available to patients in the interest of transparency.  However, please be advised that this is a medical document.  It is intended as [id number] communication.  It is written and medical language may contain abbreviations or verbiage that are technical and unfamiliar.  It may appear blunt or direct.  Medical documents are intended to carry relevant information, facts as evident, and the clinical opinion of the practitioner.

## (undated) NOTE — LETTER
Dear New MomKevin, we missed you! The nurses of [facility] have tried to reach you by phone to ask if you have any questions regarding your health or the health and care of your new little one.    We hope you are doing well. If, for any reason, you have questions or concerns about your health or your baby’s health, please contact your provider or your pediatrician or family medicine physician regarding your baby.     At [facility], we feel that postpartum support is very important for new families. Please see the enclosed new parent support flyer that lists support programs and resources with both in-person and online options.     Additionally, our Breastfeeding Centers at [facility] and [facility] in [place], offer outpatient visits with our International Board-Certified Lactation   Consultants (IBCLCs) for any breastfeeding concerns or questions you may have.    For issues related to stress, anxiety or depression, we have a Nurturing Mom support group that meets both in-person or online.  There’s also a 24-hour Mom’s Line where you can request a phone call from a clinical therapist for assistance for postpartum depression.    We encourage you to take advantage of these programs and resources as you recover from childbirth and learn to care for your new infant.    Best wishes,    [facility]            [phone]